# Patient Record
Sex: FEMALE | Race: BLACK OR AFRICAN AMERICAN | NOT HISPANIC OR LATINO | Employment: FULL TIME | URBAN - METROPOLITAN AREA
[De-identification: names, ages, dates, MRNs, and addresses within clinical notes are randomized per-mention and may not be internally consistent; named-entity substitution may affect disease eponyms.]

---

## 2018-04-09 ENCOUNTER — HOSPITAL ENCOUNTER (EMERGENCY)
Facility: OTHER | Age: 38
Discharge: HOME | End: 2018-04-09
Attending: EMERGENCY MEDICINE
Payer: COMMERCIAL

## 2018-04-09 VITALS
DIASTOLIC BLOOD PRESSURE: 86 MMHG | TEMPERATURE: 98 F | BODY MASS INDEX: 35.33 KG/M2 | WEIGHT: 192 LBS | RESPIRATION RATE: 18 BRPM | OXYGEN SATURATION: 100 % | HEIGHT: 62 IN | SYSTOLIC BLOOD PRESSURE: 136 MMHG | HEART RATE: 86 BPM

## 2018-04-09 DIAGNOSIS — R51.9 ACUTE NONINTRACTABLE HEADACHE, UNSPECIFIED HEADACHE TYPE: ICD-10-CM

## 2018-04-09 DIAGNOSIS — J06.9 VIRAL URI WITH COUGH: Primary | ICD-10-CM

## 2018-04-09 DIAGNOSIS — R05.9 COUGH: ICD-10-CM

## 2018-04-09 LAB
B-HCG UR QL: NEGATIVE
CTP QC/QA: YES

## 2018-04-09 PROCEDURE — 25000003 PHARM REV CODE 250: Performed by: PHYSICIAN ASSISTANT

## 2018-04-09 PROCEDURE — 81025 URINE PREGNANCY TEST: CPT | Performed by: PHYSICIAN ASSISTANT

## 2018-04-09 PROCEDURE — 99284 EMERGENCY DEPT VISIT MOD MDM: CPT

## 2018-04-09 RX ORDER — NAPROXEN 500 MG/1
500 TABLET ORAL
Status: COMPLETED | OUTPATIENT
Start: 2018-04-09 | End: 2018-04-09

## 2018-04-09 RX ORDER — BENZONATATE 100 MG/1
100 CAPSULE ORAL 3 TIMES DAILY PRN
Qty: 20 CAPSULE | Refills: 0 | Status: SHIPPED | OUTPATIENT
Start: 2018-04-09 | End: 2018-04-19

## 2018-04-09 RX ORDER — FLUTICASONE PROPIONATE 50 MCG
1 SPRAY, SUSPENSION (ML) NASAL DAILY
Qty: 1 BOTTLE | Refills: 0 | Status: SHIPPED | OUTPATIENT
Start: 2018-04-09 | End: 2019-11-27

## 2018-04-09 RX ORDER — IBUPROFEN 800 MG/1
800 TABLET ORAL EVERY 6 HOURS PRN
Qty: 20 TABLET | Refills: 0 | Status: SHIPPED | OUTPATIENT
Start: 2018-04-09 | End: 2019-11-27

## 2018-04-09 RX ADMIN — NAPROXEN 500 MG: 500 TABLET ORAL at 06:04
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
## 2018-06-21 ENCOUNTER — HOSPITAL ENCOUNTER (OUTPATIENT)
Dept: RADIOLOGY | Facility: OTHER | Age: 38
Discharge: HOME | End: 2018-06-21
Attending: INTERNAL MEDICINE
Payer: COMMERCIAL

## 2018-06-21 DIAGNOSIS — R60.0 EDEMA, LEG: ICD-10-CM

## 2018-06-21 PROCEDURE — 93971 EXTREMITY STUDY: CPT | Mod: 26,,, | Performed by: RADIOLOGY

## 2018-06-21 PROCEDURE — 93971 EXTREMITY STUDY: CPT | Mod: TC

## 2019-11-27 ENCOUNTER — OFFICE VISIT (OUTPATIENT)
Dept: INTERNAL MEDICINE | Facility: CLINIC | Age: 39
End: 2019-11-27
Payer: COMMERCIAL

## 2019-11-27 VITALS
RESPIRATION RATE: 16 BRPM | BODY MASS INDEX: 34.89 KG/M2 | SYSTOLIC BLOOD PRESSURE: 116 MMHG | HEIGHT: 62 IN | WEIGHT: 189.63 LBS | DIASTOLIC BLOOD PRESSURE: 80 MMHG | HEART RATE: 81 BPM | TEMPERATURE: 99 F

## 2019-11-27 DIAGNOSIS — M32.9 SYSTEMIC LUPUS ERYTHEMATOSUS, UNSPECIFIED SLE TYPE, UNSPECIFIED ORGAN INVOLVEMENT STATUS: ICD-10-CM

## 2019-11-27 DIAGNOSIS — Z12.39 BREAST CANCER SCREENING: ICD-10-CM

## 2019-11-27 DIAGNOSIS — Z12.4 CERVICAL CANCER SCREENING: ICD-10-CM

## 2019-11-27 DIAGNOSIS — M35.00 SJOGREN'S SYNDROME, WITH UNSPECIFIED ORGAN INVOLVEMENT: ICD-10-CM

## 2019-11-27 DIAGNOSIS — Z01.00 VISION TEST: ICD-10-CM

## 2019-11-27 DIAGNOSIS — Z00.00 ANNUAL PHYSICAL EXAM: Primary | ICD-10-CM

## 2019-11-27 PROCEDURE — 90471 TDAP VACCINE GREATER THAN OR EQUAL TO 7YO IM: ICD-10-PCS | Mod: S$GLB,,, | Performed by: HOSPITALIST

## 2019-11-27 PROCEDURE — 90715 TDAP VACCINE 7 YRS/> IM: CPT | Mod: S$GLB,,, | Performed by: HOSPITALIST

## 2019-11-27 PROCEDURE — 99386 PREV VISIT NEW AGE 40-64: CPT | Mod: 25,S$GLB,, | Performed by: HOSPITALIST

## 2019-11-27 PROCEDURE — 90715 TDAP VACCINE GREATER THAN OR EQUAL TO 7YO IM: ICD-10-PCS | Mod: S$GLB,,, | Performed by: HOSPITALIST

## 2019-11-27 PROCEDURE — 99999 PR PBB SHADOW E&M-EST. PATIENT-LVL V: CPT | Mod: PBBFAC,,, | Performed by: HOSPITALIST

## 2019-11-27 PROCEDURE — 99999 PR PBB SHADOW E&M-EST. PATIENT-LVL V: ICD-10-PCS | Mod: PBBFAC,,, | Performed by: HOSPITALIST

## 2019-11-27 PROCEDURE — 99386 PR PREVENTIVE VISIT,NEW,40-64: ICD-10-PCS | Mod: 25,S$GLB,, | Performed by: HOSPITALIST

## 2019-11-27 PROCEDURE — 90471 IMMUNIZATION ADMIN: CPT | Mod: S$GLB,,, | Performed by: HOSPITALIST

## 2019-11-27 RX ORDER — ERGOCALCIFEROL 1.25 MG/1
CAPSULE ORAL
Refills: 5 | COMMUNITY
Start: 2019-08-26 | End: 2019-12-03 | Stop reason: SDUPTHER

## 2019-11-27 RX ORDER — AMOXICILLIN 500 MG
CAPSULE ORAL
COMMUNITY
End: 2019-11-27

## 2019-11-27 RX ORDER — DICYCLOMINE HYDROCHLORIDE 20 MG/1
20 TABLET ORAL
COMMUNITY
Start: 2019-03-11 | End: 2019-11-27

## 2019-11-29 ENCOUNTER — HOSPITAL ENCOUNTER (OUTPATIENT)
Dept: RADIOLOGY | Facility: HOSPITAL | Age: 39
Discharge: HOME | End: 2019-11-29
Attending: HOSPITALIST
Payer: COMMERCIAL

## 2019-11-29 DIAGNOSIS — Z12.39 BREAST CANCER SCREENING: ICD-10-CM

## 2019-11-29 PROBLEM — M35.00 SJOGREN'S SYNDROME: Status: ACTIVE | Noted: 2019-11-29

## 2019-11-29 PROCEDURE — 77063 BREAST TOMOSYNTHESIS BI: CPT | Mod: 26,,, | Performed by: RADIOLOGY

## 2019-11-29 PROCEDURE — 77067 SCR MAMMO BI INCL CAD: CPT | Mod: 26,,, | Performed by: RADIOLOGY

## 2019-11-29 PROCEDURE — 77067 MAMMO DIGITAL SCREENING BILAT WITH TOMOSYNTHESIS_CAD: ICD-10-PCS | Mod: 26,,, | Performed by: RADIOLOGY

## 2019-11-29 PROCEDURE — 77067 SCR MAMMO BI INCL CAD: CPT | Mod: TC,PO

## 2019-11-29 PROCEDURE — 77063 MAMMO DIGITAL SCREENING BILAT WITH TOMOSYNTHESIS_CAD: ICD-10-PCS | Mod: 26,,, | Performed by: RADIOLOGY

## 2019-12-02 DIAGNOSIS — E55.9 VITAMIN D INSUFFICIENCY: ICD-10-CM

## 2019-12-03 RX ORDER — ERGOCALCIFEROL 1.25 MG/1
50000 CAPSULE ORAL
Qty: 12 CAPSULE | Refills: 3 | Status: SHIPPED | OUTPATIENT
Start: 2019-12-03 | End: 2020-11-30

## 2019-12-03 RX ORDER — ERGOCALCIFEROL 1.25 MG/1
CAPSULE ORAL
Refills: 5 | Status: CANCELLED | OUTPATIENT
Start: 2019-12-03

## 2019-12-03 NOTE — TELEPHONE ENCOUNTER
----- Message from Nina Gordon MD sent at 12/2/2019  8:13 AM CST -----  Please notify pt:   - Electrolytes, kidney and liver function are normal  - CBC is normal/acceptable range;    - TSH (thyroid) level is normal  - Vitamin D level is low; Vitamin D is important for healthy bones and calcium absorption. I recommend  To take supplement of vitamin D2 50,000 I.u x once a week x 2 months. Then start vitamin D3 1000 I.u daily. You can find this over the counter at your local pharmacy  - Cholesterol panel is normal  - HIV is negative  - Hemoglobin A1c is normal. No signs of diabetes   - Urine study is normal  - rheumatoid factor is negative.   - TRICIA lupus panel is still pending

## 2019-12-03 NOTE — TELEPHONE ENCOUNTER
Patient informed of results and verbalized understanding.  Did you want to send a Rx to the pharmacy for Vitamin D.

## 2019-12-03 NOTE — TELEPHONE ENCOUNTER
----- Message from Nian Gordon MD sent at 12/3/2019  2:10 PM CST -----  MMG is negative for malignancy

## 2019-12-03 NOTE — TELEPHONE ENCOUNTER
----- Message from Miladis Granger sent at 12/3/2019  4:19 PM CST -----  Contact: Pt self Mobile/Home [phone]   Patient is returning a phone call.  Who left a message for the patient: Mony Jo LPN    Does patient know what this is regarding:  A message from Mony Jo LPN

## 2019-12-03 NOTE — TELEPHONE ENCOUNTER
----- Message from Nina Gordon MD sent at 12/3/2019  2:07 PM CST -----  Please notify pt that TRICIA returned negative for lupus screen. Recommend to keep appointment with rheumatology coming up

## 2019-12-24 ENCOUNTER — OFFICE VISIT (OUTPATIENT)
Dept: OBSTETRICS AND GYNECOLOGY | Facility: CLINIC | Age: 39
End: 2019-12-24
Attending: OBSTETRICS & GYNECOLOGY
Payer: COMMERCIAL

## 2019-12-24 VITALS
SYSTOLIC BLOOD PRESSURE: 112 MMHG | BODY MASS INDEX: 34.69 KG/M2 | HEIGHT: 62 IN | WEIGHT: 188.5 LBS | DIASTOLIC BLOOD PRESSURE: 74 MMHG

## 2019-12-24 DIAGNOSIS — Z01.419 WELL FEMALE EXAM WITH ROUTINE GYNECOLOGICAL EXAM: Primary | ICD-10-CM

## 2019-12-24 DIAGNOSIS — Z11.3 SCREENING FOR VENEREAL DISEASE: ICD-10-CM

## 2019-12-24 DIAGNOSIS — N20.0 RENAL STONE: ICD-10-CM

## 2019-12-24 LAB
C TRACH DNA SPEC QL NAA+PROBE: NOT DETECTED
N GONORRHOEA DNA SPEC QL NAA+PROBE: NOT DETECTED

## 2019-12-24 PROCEDURE — 99386 PR PREVENTIVE VISIT,NEW,40-64: ICD-10-PCS | Mod: S$GLB,,, | Performed by: OBSTETRICS & GYNECOLOGY

## 2019-12-24 PROCEDURE — 99386 PREV VISIT NEW AGE 40-64: CPT | Mod: S$GLB,,, | Performed by: OBSTETRICS & GYNECOLOGY

## 2019-12-24 PROCEDURE — 88175 CYTOPATH C/V AUTO FLUID REDO: CPT

## 2019-12-24 PROCEDURE — 99999 PR PBB SHADOW E&M-EST. PATIENT-LVL III: CPT | Mod: PBBFAC,,, | Performed by: OBSTETRICS & GYNECOLOGY

## 2019-12-24 PROCEDURE — 99999 PR PBB SHADOW E&M-EST. PATIENT-LVL III: ICD-10-PCS | Mod: PBBFAC,,, | Performed by: OBSTETRICS & GYNECOLOGY

## 2019-12-24 PROCEDURE — 87491 CHLMYD TRACH DNA AMP PROBE: CPT

## 2019-12-24 RX ORDER — ONDANSETRON 4 MG/1
4 TABLET, ORALLY DISINTEGRATING ORAL
COMMUNITY
Start: 2019-12-18 | End: 2019-12-25

## 2019-12-24 RX ORDER — IBUPROFEN 600 MG/1
600 TABLET ORAL
COMMUNITY
Start: 2019-12-18 | End: 2019-12-25

## 2019-12-24 NOTE — PROGRESS NOTES
SUBJECTIVE:   39 y.o. female   for routine gyn exam. Patient's last menstrual period was 2019 (exact date)..  She has no unusual complaints.  Desires STD screen with GC, CT cx.  Went to ER for renal stone.  Has not yet had urology follow up.  Taking tylenol and ibuprofen only.  Did not like narcotic.         Past Medical History:   Diagnosis Date    Lupus     Rheumatoid arthritis     Sjoegren syndrome      Past Surgical History:   Procedure Laterality Date    CHOLECYSTECTOMY      DENTAL SURGERY      SINUS ENDOSCOPY      TUBAL LIGATION       Social History     Socioeconomic History    Marital status:      Spouse name: Not on file    Number of children: 3    Years of education: Not on file    Highest education level: Not on file   Occupational History    Occupation: Teacher   Social Needs    Financial resource strain: Not on file    Food insecurity:     Worry: Not on file     Inability: Not on file    Transportation needs:     Medical: Not on file     Non-medical: Not on file   Tobacco Use    Smoking status: Never Smoker    Smokeless tobacco: Never Used   Substance and Sexual Activity    Alcohol use: Yes     Comment: occ    Drug use: No    Sexual activity: Yes     Partners: Male     Birth control/protection: Condom, See Surgical Hx   Lifestyle    Physical activity:     Days per week: Not on file     Minutes per session: Not on file    Stress: Not on file   Relationships    Social connections:     Talks on phone: Not on file     Gets together: Not on file     Attends Jewish service: Not on file     Active member of club or organization: Not on file     Attends meetings of clubs or organizations: Not on file     Relationship status: Not on file   Other Topics Concern    Not on file   Social History Narrative    Not on file     Family History   Problem Relation Age of Onset    Diabetes Mother     Hypertension Mother     Lupus Father     Ovarian cancer Neg Hx     Colon  "cancer Neg Hx     Breast cancer Neg Hx      OB History    Para Term  AB Living   3 3 0 3   3   SAB TAB Ectopic Multiple Live Births                  # Outcome Date GA Lbr Johnson/2nd Weight Sex Delivery Anes PTL Lv   3             2             1                   Current Outpatient Medications   Medication Sig Dispense Refill    ergocalciferol (ERGOCALCIFEROL) 50,000 unit Cap Take 1 capsule (50,000 Units total) by mouth every 7 days. 12 capsule 3    ibuprofen (ADVIL,MOTRIN) 600 MG tablet Take 600 mg by mouth.      ondansetron (ZOFRAN-ODT) 4 MG TbDL Take 4 mg by mouth.       No current facility-administered medications for this visit.      Allergies: Penicillins and Latex     ROS:  Constitutional: no weight loss, weight gain, fever, fatigue  Eyes:  No vision changes, glasses/contacts  ENT/Mouth: No ulcers, sinus problems, ears ringing, headache  Cardiovascular: No inability to lie flat, chest pain, exercise intolerance, swelling, heart palpitations  Respiratory: No wheezing, coughing blood, shortness of breath, or cough  Gastrointestinal: No diarrhea, bloody stool, nausea/vomiting, constipation, gas, hemorrhoids  Genitourinary: No blood in urine, painful urination, urgency of urination, frequency of urination, incomplete emptying, incontinence, abnormal bleeding, painful periods, heavy periods, vaginal discharge, vaginal odor, painful intercourse, sexual problems, bleeding after intercourse.  Musculoskeletal: No muscle weakness  Skin/Breast: No painful breasts, nipple discharge, masses, rash, ulcers  Neurological: No passing out, seizures, numbness, headache  Endocrine: No diabetes, hypothyroid, hyperthyroid, hot flashes, hair loss, abnormal hair growth, acne  Psychiatric: No depression, crying  Hematologic: No bruises, bleeding, swollen lymph nodes, anemia.      OBJECTIVE:   The patient appears well, alert, oriented x 3, in no distress.  /74   Ht 5' 2" (1.575 m)   Wt 85.5 " kg (188 lb 7.9 oz)   LMP 12/09/2019 (Exact Date)   BMI 34.48 kg/m²   NECK: no thyromegaly, trachea midline  SKIN: no acne, striae, hirsutism  CHEST: CTAB  CV: RRR  BREAST EXAM: breasts appear normal, no suspicious masses, no skin or nipple changes or axillary nodes  ABDOMEN: no hernias, masses, or hepatosplenomegaly  GENITALIA: normal external genitalia, no erythema, no discharge  URETHRA: normal urethra, normal urethral meatus  VAGINA: Normal  CERVIX: no lesions or cervical motion tenderness  UTERUS: 10-12 week size, normal position, consistency, mobility, non-tender  ADNEXA: no mass, fullness, tenderness      ASSESSMENT:   1. Well female exam with routine gynecological exam  Liquid-Based Pap Smear, Screening   2. Screening for venereal disease  C. trachomatis/N. gonorrhoeae by [facility]; Cervix   3. Renal stone  Ambulatory consult to Urology       PLAN:   Orders Placed This Encounter    C. trachomatis/N. gonorrhoeae by [facility]; Cervix    Ambulatory consult to Urology    Liquid-Based Pap Smear, Screening     Discussed healthy lifestyle including regular exercise, healthy eating, etc.  Return to clinic in 1 year

## 2019-12-24 NOTE — LETTER
December 24, 2019      Nina Gordon MD  [address] [place] [address] [facility] [address]           [facility] [id number] [address]  [address] [address]  Phone: [phone]  Fax: [phone]          Patient: Kimmy De La Paz   MR Number: [id number]   YOB: 1980   Date of Visit: 12/24/2019       Dear Dr. Nina Gordon:    Thank you for referring Kimmy De La Paz to me for evaluation. Attached you will find relevant portions of my assessment and plan of care.    If you have questions, please do not hesitate to call me. I look forward to following Kimmy De La Paz along with you.    Sincerely,    Mary Thao MD    Enclosure  CC:  No Recipients    If you would like to receive this communication electronically, please contact [email or web address] or [phone] to request more information on [facility] access.    For providers and/or their staff who would like to refer a patient to [facility], please contact us through our one-stop-shop provider referral line, [facility], at [phone].    If you feel you have received this communication in error or would no longer like to receive these types of communications, please e-mail [email or web address]

## 2019-12-31 ENCOUNTER — OFFICE VISIT (OUTPATIENT)
Dept: UROLOGY | Facility: CLINIC | Age: 39
End: 2019-12-31
Attending: OBSTETRICS & GYNECOLOGY
Payer: COMMERCIAL

## 2019-12-31 VITALS — HEART RATE: 81 BPM | SYSTOLIC BLOOD PRESSURE: 139 MMHG | DIASTOLIC BLOOD PRESSURE: 86 MMHG

## 2019-12-31 DIAGNOSIS — N20.0 NEPHROLITHIASIS: Primary | ICD-10-CM

## 2019-12-31 DIAGNOSIS — N20.1 URETEROLITHIASIS: ICD-10-CM

## 2019-12-31 PROCEDURE — 87086 URINE CULTURE/COLONY COUNT: CPT

## 2019-12-31 PROCEDURE — 99203 OFFICE O/P NEW LOW 30 MIN: CPT | Mod: S$GLB,,, | Performed by: NURSE PRACTITIONER

## 2019-12-31 PROCEDURE — 99203 PR OFFICE/OUTPT VISIT, NEW, LEVL III, 30-44 MIN: ICD-10-PCS | Mod: S$GLB,,, | Performed by: NURSE PRACTITIONER

## 2019-12-31 RX ORDER — TAMSULOSIN HYDROCHLORIDE 0.4 MG/1
0.4 CAPSULE ORAL DAILY
Qty: 30 CAPSULE | Refills: 0 | Status: SHIPPED | OUTPATIENT
Start: 2019-12-31 | End: 2020-12-04

## 2019-12-31 NOTE — LETTER
December 31, 2019      Mary Thao MD  [address] [facility] [address]  [facility] [address]           [facility] [address] [address]  [address] [address]  Phone: [phone]  Fax: [phone]          Patient: Kimmy De La Paz   MR Number: [id number]   YOB: 1980   Date of Visit: 12/31/2019       Dear Dr. Mary Thao:    Thank you for referring Kimmy De La Paz to me for evaluation. Attached you will find relevant portions of my assessment and plan of care.    If you have questions, please do not hesitate to call me. I look forward to following Kimmy De La Paz along with you.    Sincerely,    Antoinette Suero, NP    Enclosure  CC:  No Recipients    If you would like to receive this communication electronically, please contact [email or web address] or [phone] to request more information on [facility] Link access.    For providers and/or their staff who would like to refer a patient to [facility], please contact us through our one-stop-shop provider referral line, [facility], at [phone].    If you feel you have received this communication in error or would no longer like to receive these types of communications, please e-mail [email or web address]

## 2019-12-31 NOTE — PROGRESS NOTES
Subjective:      Kimmy De La Paz is a 39 y.o. female who was self-referred for evaluation of her nephrolithiasis.      The patient presented to the ED on [facility] on 12/18 with left flank pain. CT scan revealed a 2m0h4cf stone at her left UVJ with moderate hydronephrosis (report reviewed through care everywhere). Creatinine was stable and UA was negative for infection. She was given zofran, percocet and toradol.    Today she reports continued intermittent moderate left flank pain. Also with urinary frequency and urgency. Denies dysuria, fever/chills and N/V.     + history of stone that she passed without intervention in 2018. Unsure the composition of the stone.     The following portions of the patient's history were reviewed and updated as appropriate: allergies, current medications, past family history, past medical history, past social history, past surgical history and problem list.    Review of Systems  Constitutional: no fever or chills  ENT: no nasal congestion or sore throat  Respiratory: no cough or shortness of breath  Cardiovascular: no chest pain or palpitations  Gastrointestinal: no nausea or vomiting, tolerating diet  Genitourinary: as per HPI  Hematologic/Lymphatic: no easy bruising or lymphadenopathy  Musculoskeletal: no arthralgias or myalgias  Neurological: no seizures or tremors  Behavioral/Psych: no auditory or visual hallucinations     Objective:   Vital Signs:   Vitals:    12/31/19 0749   BP: 139/86   Pulse: 81       Physical Exam   General: alert and oriented, no acute distress  Head: normocephalic, atraumatic  Neck: supple, no lymphadenopathy, normal ROM, no masses  Respiratory: Symmetric expansion, non-labored breathing  Cardiovascular: regular rate and rhythm, nomal pulses, no peripheral edema  Abdomen: soft, non tender, non distended, no palpable masses, no hernias, no hepatomegaly or splenomegaly  Pelvic: deferred  Lymphatic: no inguinal nodes  Skin: normal coloration and turgor, no  "rashes, no suspicious skin lesions noted  Neuro: alert and oriented x3, no gross deficits  Psych: normal judgment and insight, normal mood/affect and non-anxious  Mild left CVA tenderness    Lab Review   Urinalysis demonstrates negative for all components  Lab Results   Component Value Date    WBC 6.51 11/29/2019    HGB 11.6 (L) 11/29/2019    HCT 39.4 11/29/2019    MCV 82 11/29/2019     (H) 11/29/2019     Lab Results   Component Value Date    CREATININE 0.8 11/29/2019    BUN 10 11/29/2019       Imaging   CT reviewed in care everywhere- "Left kidney is edematous. There is perinephric and periureteral edema and mild hydroureteronephrosis due to a calculus which is within the distal ureter at the junction with the bladder. It has not entered the tunnel portion of the UVJ. It measures 3 mm x 5 mm x 5 mm. The right kidney is normal. Urinary bladder is mildly filled and not generally Thickened."     Assessment:   Ureterolithiasis    Plan:   Diagnoses and all orders for this visit:    Nephrolithiasis  -     tamsulosin (FLOMAX) 0.4 mg Cap; Take 1 capsule (0.4 mg total) by mouth once daily.  -     US Retroperitoneal Complete (Kidney and; Future  -     Urine culture    Ureterolithiasis    Plan:  --Advised of the options of watch and wait vs intervention  ureteroscopic approach vs ESL. Will proceed with trial of passage given the size and location of the stone   --Flomax  --Percocet, zofran and toradol PRN   --Strain all urine.  --Encourage fluids.  --Recommend general preventive measures, to include increased hydration, low Na diet, and increased citrus intake  --Discussed indications to present to ED, including fever, intractable pain, and intractable nausea/vomitting  --Follow up in 3 weeks with DONELL prior to visit     "

## 2020-01-01 LAB — BACTERIA UR CULT: NORMAL

## 2020-01-11 ENCOUNTER — HOSPITAL ENCOUNTER (OUTPATIENT)
Dept: RADIOLOGY | Facility: OTHER | Age: 40
Discharge: HOME | End: 2020-01-11
Attending: NURSE PRACTITIONER
Payer: COMMERCIAL

## 2020-01-11 DIAGNOSIS — N20.0 NEPHROLITHIASIS: ICD-10-CM

## 2020-01-11 PROCEDURE — 76770 US EXAM ABDO BACK WALL COMP: CPT | Mod: TC

## 2020-01-11 PROCEDURE — 76770 US RETROPERITONEAL COMPLETE: ICD-10-PCS | Mod: 26,,, | Performed by: RADIOLOGY

## 2020-01-11 PROCEDURE — 76770 US EXAM ABDO BACK WALL COMP: CPT | Mod: 26,,, | Performed by: RADIOLOGY

## 2020-01-13 LAB
FINAL PATHOLOGIC DIAGNOSIS: NORMAL
Lab: NORMAL

## 2020-01-20 ENCOUNTER — OFFICE VISIT (OUTPATIENT)
Dept: UROLOGY | Facility: CLINIC | Age: 40
End: 2020-01-20
Payer: COMMERCIAL

## 2020-01-20 VITALS
SYSTOLIC BLOOD PRESSURE: 135 MMHG | HEART RATE: 81 BPM | HEIGHT: 62 IN | WEIGHT: 196 LBS | BODY MASS INDEX: 36.07 KG/M2 | DIASTOLIC BLOOD PRESSURE: 89 MMHG

## 2020-01-20 DIAGNOSIS — N20.0 NEPHROLITHIASIS: Primary | ICD-10-CM

## 2020-01-20 PROCEDURE — 99213 PR OFFICE/OUTPT VISIT, EST, LEVL III, 20-29 MIN: ICD-10-PCS | Mod: S$GLB,,, | Performed by: NURSE PRACTITIONER

## 2020-01-20 PROCEDURE — 99213 OFFICE O/P EST LOW 20 MIN: CPT | Mod: S$GLB,,, | Performed by: NURSE PRACTITIONER

## 2020-01-20 PROCEDURE — 3008F PR BODY MASS INDEX (BMI) DOCUMENTED: ICD-10-PCS | Mod: CPTII,S$GLB,, | Performed by: NURSE PRACTITIONER

## 2020-01-20 PROCEDURE — 3008F BODY MASS INDEX DOCD: CPT | Mod: CPTII,S$GLB,, | Performed by: NURSE PRACTITIONER

## 2020-01-20 NOTE — PROGRESS NOTES
"Subjective:      Kimmy De La Paz is a 39 y.o. female who returns today regarding her ureterolithiasis.     + history of stone that she passed without intervention in 2018. Unsure the composition of the stone.     The patient presented to the ED on [facility] on 12/18 with left flank pain. CT scan revealed a 3y3w9ii stone at her left UVJ with moderate hydronephrosis (report reviewed through care everywhere). Creatinine was stable and UA was negative for infection. She was given zofran, percocet and toradol.    She presents today with follow up DONELL. Denies flank pain for the last few weeks. Denies urinary symptoms. Denies fever/chills and N/V. Denies seeing the stone pass, although did not strain her urine consistently.     The following portions of the patient's history were reviewed and updated as appropriate: allergies, current medications, past family history, past medical history, past social history, past surgical history and problem list.    Review of Systems  Constitutional: no fever or chills  ENT: no nasal congestion or sore throat  Respiratory: no cough or shortness of breath  Cardiovascular: no chest pain or palpitations  Gastrointestinal: no nausea or vomiting, tolerating diet  Genitourinary: as per HPI  Hematologic/Lymphatic: no easy bruising or lymphadenopathy  Musculoskeletal: no arthralgias or myalgias  Neurological: no seizures or tremors  Behavioral/Psych: no auditory or visual hallucinations     Objective:   Vital Signs:/89 (BP Location: Right arm, Patient Position: Sitting, BP Method: X-Large (Automatic))   Pulse 81   Ht 5' 2" (1.575 m)   Wt 88.9 kg (196 lb)   LMP 01/04/2020   BMI 35.85 kg/m²     Physical Exam   General: alert and oriented, no acute distress  Head: normocephalic, atraumatic  Neck: supple, no lymphadenopathy, normal ROM, no masses  Respiratory: Symmetric expansion, non-labored breathing  Cardiovascular: regular rate and rhythm, nomal pulses, no peripheral edema  Abdomen: " soft, non tender, non distended, no palpable masses, no hernias, no hepatomegaly or splenomegaly  Pelvic: deferred  Lymphatic: no inguinal nodes  Skin: normal coloration and turgor, no rashes, no suspicious skin lesions noted  Neuro: alert and oriented x3, no gross deficits  Psych: normal judgment and insight, normal mood/affect and non-anxious  No CVA tenderness    Lab Review   Urinalysis demonstrates : no specimen   Lab Results   Component Value Date    WBC 6.51 11/29/2019    HGB 11.6 (L) 11/29/2019    HCT 39.4 11/29/2019    MCV 82 11/29/2019     (H) 11/29/2019     Lab Results   Component Value Date    CREATININE 0.8 11/29/2019    BUN 10 11/29/2019       Imaging   DONELL (1/11/20)- no stones, masses or hydro    Assessment:     1. Nephrolithiasis        Plan:   Kimmy was seen today for follow-up.    Diagnoses and all orders for this visit:    Nephrolithiasis  -     POCT URINE DIPSTICK WITHOUT MICROSCOPE  -     X-Ray KUB; Future    Plan:  --Explained that the stone has likely passed given the lack of pain and hydro on US  --Recommend general preventive measures, to include increased hydration, low Na diet, and increased citrus intake  --Follow up in 6 months with KUB to monitor

## 2020-05-18 ENCOUNTER — OFFICE VISIT (OUTPATIENT)
Dept: INTERNAL MEDICINE | Facility: CLINIC | Age: 40
End: 2020-05-18
Payer: COMMERCIAL

## 2020-05-18 ENCOUNTER — LAB VISIT (OUTPATIENT)
Dept: LAB | Facility: HOSPITAL | Age: 40
End: 2020-05-18
Attending: HOSPITALIST
Payer: COMMERCIAL

## 2020-05-18 VITALS
HEIGHT: 62 IN | HEART RATE: 108 BPM | RESPIRATION RATE: 18 BRPM | SYSTOLIC BLOOD PRESSURE: 110 MMHG | WEIGHT: 182.75 LBS | DIASTOLIC BLOOD PRESSURE: 82 MMHG | OXYGEN SATURATION: 98 % | BODY MASS INDEX: 33.63 KG/M2 | TEMPERATURE: 99 F

## 2020-05-18 DIAGNOSIS — R07.9 CHEST PAIN, UNSPECIFIED TYPE: ICD-10-CM

## 2020-05-18 DIAGNOSIS — Z01.84 ANTIBODY RESPONSE EXAMINATION: ICD-10-CM

## 2020-05-18 DIAGNOSIS — F41.9 ANXIETY AND DEPRESSION: Primary | ICD-10-CM

## 2020-05-18 DIAGNOSIS — H53.8 BLURRY VISION: ICD-10-CM

## 2020-05-18 DIAGNOSIS — F32.A ANXIETY AND DEPRESSION: Primary | ICD-10-CM

## 2020-05-18 DIAGNOSIS — Z20.822 EXPOSURE TO COVID-19 VIRUS: ICD-10-CM

## 2020-05-18 LAB — SARS-COV-2 IGG SERPLBLD QL IA.RAPID: NEGATIVE

## 2020-05-18 PROCEDURE — 93010 EKG 12-LEAD: ICD-10-PCS | Mod: S$GLB,,, | Performed by: INTERNAL MEDICINE

## 2020-05-18 PROCEDURE — 93005 EKG 12-LEAD: ICD-10-PCS | Mod: S$GLB,,, | Performed by: HOSPITALIST

## 2020-05-18 PROCEDURE — 99214 OFFICE O/P EST MOD 30 MIN: CPT | Mod: S$GLB,,, | Performed by: HOSPITALIST

## 2020-05-18 PROCEDURE — 93010 ELECTROCARDIOGRAM REPORT: CPT | Mod: S$GLB,,, | Performed by: INTERNAL MEDICINE

## 2020-05-18 PROCEDURE — 99999 PR PBB SHADOW E&M-EST. PATIENT-LVL IV: ICD-10-PCS | Mod: PBBFAC,,, | Performed by: HOSPITALIST

## 2020-05-18 PROCEDURE — 99999 PR PBB SHADOW E&M-EST. PATIENT-LVL IV: CPT | Mod: PBBFAC,,, | Performed by: HOSPITALIST

## 2020-05-18 PROCEDURE — 3008F BODY MASS INDEX DOCD: CPT | Mod: CPTII,S$GLB,, | Performed by: HOSPITALIST

## 2020-05-18 PROCEDURE — 93005 ELECTROCARDIOGRAM TRACING: CPT | Mod: S$GLB,,, | Performed by: HOSPITALIST

## 2020-05-18 PROCEDURE — 99214 PR OFFICE/OUTPT VISIT, EST, LEVL IV, 30-39 MIN: ICD-10-PCS | Mod: S$GLB,,, | Performed by: HOSPITALIST

## 2020-05-18 PROCEDURE — 86769 SARS-COV-2 COVID-19 ANTIBODY: CPT

## 2020-05-18 PROCEDURE — 3008F PR BODY MASS INDEX (BMI) DOCUMENTED: ICD-10-PCS | Mod: CPTII,S$GLB,, | Performed by: HOSPITALIST

## 2020-05-18 PROCEDURE — 36415 COLL VENOUS BLD VENIPUNCTURE: CPT | Mod: PO

## 2020-05-18 RX ORDER — CITALOPRAM 10 MG/1
10 TABLET ORAL DAILY
Qty: 30 TABLET | Refills: 5 | Status: SHIPPED | OUTPATIENT
Start: 2020-05-18 | End: 2022-11-09

## 2020-05-18 NOTE — PROGRESS NOTES
"Subjective:     @Patient ID: Kimmy De La Paz is a 39 y.o. female.    Chief Complaint: Follow-up    HPI  38 yo F presents with multiple concerns.    Reports lately feeling overwhelmed. Reports feeling depressed and anxious. 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 Reports sleeping issues. Reports staying up. Reports trouble concentrating. No SI. Was on medication in 2010 for anxiety/depression. Reports chest pain. States center/left chest. Thinks possibly related to anxiety/mood. She works as a . She would also like covid19 antibody testing.     Review of Systems   Constitutional: Positive for activity change. Negative for unexpected weight change.   HENT: Negative for hearing loss, rhinorrhea and trouble swallowing.    Eyes: Positive for discharge and visual disturbance.   Respiratory: Negative for chest tightness and wheezing.    Cardiovascular: Positive for chest pain. Negative for palpitations.   Gastrointestinal: Negative for blood in stool, constipation, diarrhea and vomiting.   Endocrine: Negative for polydipsia and polyuria.   Genitourinary: Positive for menstrual problem. Negative for difficulty urinating, dysuria and hematuria.   Musculoskeletal: Positive for arthralgias, joint swelling and neck pain.   Neurological: Positive for headaches. Negative for weakness.   Psychiatric/Behavioral: Positive for confusion and dysphoric mood.     Past medical history, surgical history, and family medical history reviewed and updated as appropriate.    Medications and allergies reviewed.     Objective:     Vitals:    05/18/20 1116   BP: 110/82   BP Location: Right arm   Patient Position: Sitting   BP Method: Large (Manual)   Pulse: 108   Resp: 18   Temp: 98.6 °F (37 °C)   TempSrc: Oral   SpO2: 98%   Weight: 82.9 kg (182 lb 12.2 oz)   Height: 5' 2" (1.575 m)     Body mass index is 33.43 kg/m².  Physical Exam   Constitutional: She is oriented to person, place, and time. She " appears well-developed and well-nourished.   HENT:   Head: Normocephalic and atraumatic.   Eyes: Conjunctivae are normal. Right eye exhibits no discharge. Left eye exhibits no discharge.   Neck: Normal range of motion. Neck supple.   Cardiovascular: Normal rate and regular rhythm. Exam reveals no friction rub.   No murmur heard.  Pulmonary/Chest: Effort normal and breath sounds normal.   Musculoskeletal: Normal range of motion. She exhibits no edema.   Neurological: She is alert and oriented to person, place, and time.   Skin: Skin is warm and dry.   Psychiatric: Her behavior is normal. Her mood appears anxious. She exhibits a depressed mood.   Vitals reviewed.      Lab Results   Component Value Date    WBC 6.51 11/29/2019    HGB 11.6 (L) 11/29/2019    HCT 39.4 11/29/2019     (H) 11/29/2019    CHOL 172 11/29/2019    TRIG 100 11/29/2019    HDL 44 11/29/2019    ALT 13 11/29/2019    AST 16 11/29/2019     11/29/2019    K 4.0 11/29/2019     11/29/2019    CREATININE 0.8 11/29/2019    BUN 10 11/29/2019    CO2 24 11/29/2019    TSH 1.243 11/29/2019    HGBA1C 5.4 11/29/2019       Assessment:     1. Anxiety and depression    2. Chest pain, unspecified type    3. Blurry vision    4. Antibody response examination      Plan:   Kimmy was seen today for follow-up.    Diagnoses and all orders for this visit:    Anxiety and depression  - Start SSRI celexa. Pt counseled to monitor for side effects. Will also refer to psychiatry for therapy and med management.   -     Ambulatory referral/consult to Psychiatry; Future    Chest pain, unspecified type  - Atypical for cardiac etiology. Will check EKG. Suspect due to anxiety/depression  -     IN OFFICE EKG 12-LEAD (to Muse)    Blurry vision  -     Ambulatory referral/consult to Optometry; Future    Antibody response examination  -     COVID-19 (SARS CoV-2) IgG Antibody; Future    Other orders  -     citalopram (CELEXA) 10 MG tablet; Take 1 tablet (10 mg total) by mouth  once daily.          RTC prn    Nina Gordon MD  Internal Medicine    5/18/2020

## 2020-05-22 ENCOUNTER — PATIENT MESSAGE (OUTPATIENT)
Dept: RHEUMATOLOGY | Facility: CLINIC | Age: 40
End: 2020-05-22

## 2020-06-03 ENCOUNTER — OFFICE VISIT (OUTPATIENT)
Dept: OPTOMETRY | Facility: CLINIC | Age: 40
End: 2020-06-03
Payer: COMMERCIAL

## 2020-06-03 DIAGNOSIS — H52.203 MYOPIA OF BOTH EYES WITH ASTIGMATISM AND PRESBYOPIA: ICD-10-CM

## 2020-06-03 DIAGNOSIS — H52.13 MYOPIA OF BOTH EYES WITH ASTIGMATISM AND PRESBYOPIA: ICD-10-CM

## 2020-06-03 DIAGNOSIS — H04.123 DRY EYE SYNDROME OF BOTH EYES: Primary | ICD-10-CM

## 2020-06-03 DIAGNOSIS — H52.4 MYOPIA OF BOTH EYES WITH ASTIGMATISM AND PRESBYOPIA: ICD-10-CM

## 2020-06-03 DIAGNOSIS — H53.8 BLURRY VISION: ICD-10-CM

## 2020-06-03 PROCEDURE — 99999 PR PBB SHADOW E&M-EST. PATIENT-LVL III: CPT | Mod: PBBFAC,,, | Performed by: OPTOMETRIST

## 2020-06-03 PROCEDURE — 92004 COMPRE OPH EXAM NEW PT 1/>: CPT | Mod: S$GLB,,, | Performed by: OPTOMETRIST

## 2020-06-03 PROCEDURE — 92004 PR EYE EXAM, NEW PATIENT,COMPREHESV: ICD-10-PCS | Mod: S$GLB,,, | Performed by: OPTOMETRIST

## 2020-06-03 PROCEDURE — 99999 PR PBB SHADOW E&M-EST. PATIENT-LVL III: ICD-10-PCS | Mod: PBBFAC,,, | Performed by: OPTOMETRIST

## 2020-06-03 PROCEDURE — 92015 DETERMINE REFRACTIVE STATE: CPT | Mod: S$GLB,,, | Performed by: OPTOMETRIST

## 2020-06-03 PROCEDURE — 92015 PR REFRACTION: ICD-10-PCS | Mod: S$GLB,,, | Performed by: OPTOMETRIST

## 2020-06-03 NOTE — PROGRESS NOTES
HPI     MARIAELENA 1 year ago somewhere else. Pt has Lupus, used to take plaquenil but   has not taken it in several years. Patient reports blur at distance and   near OD>OS, worse at night. Also reports a shadowing/haze effect with   vision. Patient does not wear correction for distance (used to wear them   10 years ago), wears OTC reading glasses prn. Reports HA and blurry vision   at near after reading for more than 30 minutes. Reports eye injury   (student bit upper eyelid OD) 2 year ago. Reports occasional itching and   crusting, worse in the morning. Reports occasional floaters that are   longstanding, denies flashes. No gtts. Reports hx of several concussions,   most recent one 2 years ago.    Last edited by Vero Martinez, OD on 6/3/2020  4:48 PM. (History)            Assessment /Plan     For exam results, see Encounter Report.    Dry eye syndrome of both eyes    Blurry vision  -     Ambulatory referral/consult to Optometry    Myopia of both eyes with astigmatism and presbyopia      1. Educated pt on findings. Recommended OTC ATs for added lubrication and comfort. RTC if symptoms persist.   2-3. Prescribe Spec Rx. Educated patient on changes. RTC in 1 yr for annual eye exam or prn.

## 2020-06-03 NOTE — LETTER
Malika 3, 2020      Nina Gordon MD  [address] [facility]  [place] [address]           [place] [facility]  [address] [facility]  [place] [address]  Phone: [phone]  Fax: [phone]          Patient: Kimmy De La Paz   MR Number: [id number]   YOB: 1980   Date of Visit: 6/3/2020       Dear Dr. Nina Gordon:    Thank you for referring Kimmy De La Paz to me for evaluation. Attached you will find relevant portions of my assessment and plan of care.    If you have questions, please do not hesitate to call me. I look forward to following Kimmy De La Paz along with you.    Sincerely,    Vero Martinez, OD    Enclosure  CC:  No Recipients    If you would like to receive this communication electronically, please contact [email or web address] or [phone] to request more information on [facility] access.    For providers and/or their staff who would like to refer a patient to [facility], please contact us through our one-stop-shop provider referral line, [facility], at [phone].    If you feel you have received this communication in error or would no longer like to receive these types of communications, please e-mail [email or web address]

## 2020-11-16 ENCOUNTER — TELEPHONE (OUTPATIENT)
Dept: INTERNAL MEDICINE | Facility: CLINIC | Age: 40
End: 2020-11-16

## 2020-11-16 ENCOUNTER — OFFICE VISIT (OUTPATIENT)
Dept: INTERNAL MEDICINE | Facility: CLINIC | Age: 40
End: 2020-11-16
Payer: COMMERCIAL

## 2020-11-16 DIAGNOSIS — R51.9 NONINTRACTABLE EPISODIC HEADACHE, UNSPECIFIED HEADACHE TYPE: ICD-10-CM

## 2020-11-16 DIAGNOSIS — M32.9 SYSTEMIC LUPUS ERYTHEMATOSUS, UNSPECIFIED SLE TYPE, UNSPECIFIED ORGAN INVOLVEMENT STATUS: ICD-10-CM

## 2020-11-16 DIAGNOSIS — R07.9 CHEST PAIN, UNSPECIFIED TYPE: ICD-10-CM

## 2020-11-16 DIAGNOSIS — H53.8 BLURRY VISION: ICD-10-CM

## 2020-11-16 DIAGNOSIS — R11.0 NAUSEA: ICD-10-CM

## 2020-11-16 DIAGNOSIS — M35.00 SJOGREN'S SYNDROME, WITH UNSPECIFIED ORGAN INVOLVEMENT: ICD-10-CM

## 2020-11-16 DIAGNOSIS — I15.9 SECONDARY HYPERTENSION: Primary | ICD-10-CM

## 2020-11-16 PROCEDURE — 99214 OFFICE O/P EST MOD 30 MIN: CPT | Mod: 95,,, | Performed by: INTERNAL MEDICINE

## 2020-11-16 PROCEDURE — 99214 PR OFFICE/OUTPT VISIT, EST, LEVL IV, 30-39 MIN: ICD-10-PCS | Mod: 95,,, | Performed by: INTERNAL MEDICINE

## 2020-11-16 NOTE — PROGRESS NOTES
Subjective:       Patient ID: Kimmy De La Paz is a 40 y.o. female.    Chief Complaint: No chief complaint on file.   Dictation #1  MRN:[id number]  CSN:[id number]  Dict   HPI  Review of Systems   Constitutional: Positive for activity change and unexpected weight change.   HENT: Negative for hearing loss, rhinorrhea and trouble swallowing.    Eyes: Positive for visual disturbance. Negative for discharge.   Respiratory: Positive for chest tightness. Negative for wheezing.    Cardiovascular: Positive for chest pain and palpitations.   Gastrointestinal: Positive for nausea. Negative for blood in stool, constipation, diarrhea and vomiting.   Endocrine: Negative for polydipsia and polyuria.   Genitourinary: Positive for menstrual problem. Negative for difficulty urinating, dysuria and hematuria.   Musculoskeletal: Positive for arthralgias. Negative for joint swelling and neck pain.   Neurological: Positive for weakness and headaches.   Psychiatric/Behavioral: Positive for confusion and dysphoric mood.       The patient location is: home  The chief complaint leading to consultation is: f/u ER visit    Visit type: audiovisual    Face to Face time with patient: 20 min  25 minutes of total time spent on the encounter, which includes face to face time and non-face to face time preparing to see the patient (eg, review of tests), Obtaining and/or reviewing separately obtained history, Documenting clinical information in the electronic or other health record, Independently interpreting results (not separately reported) and communicating results to the patient/family/caregiver, or Care coordination (not separately reported).         Each patient to whom he or she provides medical services by telemedicine is:  (1) informed of the relationship between the physician and patient and the respective role of any other health care provider with respect to management of the patient; and (2) notified that he or she may decline to receive  medical services by telemedicine and may withdraw from such care at any time.    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  She had a COVID test done that was negative but her blood pressure was found to be 180/121.  She has been under lot of stress apparently having to do with her job and that day felt nauseated, headache, chest pain.  She apparently has had headaches on and off at least since the time that she was in the emergency room about 1 week ago.  She has also had occasional chest pain since then.  Patient also continues to have intermittent nausea that may get worse with eating but she is not certain.  Her blood pressure as mentioned at the school was 180/121 and then at the emergency room was measured at 150/95.  She has not taken her blood pressure since then.  In in the emergency room she was treated with something for nausea and had a chest x-ray done but no EKG.    Past history she does have lupus and Sjogren's and is on medication for that.  She has a family history of her father having hypertension but no one else in the family including 3 brothers and a sister.    Physical exam:  Not done since this is a virtual visit    Impression:  1.  Hypertensive reading for which she was sent the emergency room 1 week ago but no follow-up or medication sent home with her.  2.  Systemic lupus erythematosus  3.  Sjogren syndrome  4.  Recurrent headaches that is sound somewhat like migraine with some visual blurring an what sounds like specks or floaters in her visual field while she is having the headache  5.  Recurrent nausea  6.  Complains of a lot of stress related to her job in uncertain knees of her work situation  7.  Chest pain in the emergency room a no EKG was done    Plan:  1.  Patient was told she needs a actual physical evaluation to assess her condition not something I can do virtually.  I do not have a follow-up blood pressure on  her which is critical and she has the uncertainty about the cause of her headaches, chest pain, cause in the nausea.  She also apparently is having a lot of anxiety underlying these problems.  In addition she has some forms to return to work at school that I have told her to bring a long when she is seen.    Objective:        Assessment:       1. Secondary hypertension    2. Chest pain, unspecified type    3. Blurry vision    4. Sjogren's syndrome, with unspecified organ involvement    5. Systemic lupus erythematosus, unspecified SLE type, unspecified organ involvement status    6. Nausea    7. Nonintractable episodic headache, unspecified headache type        Plan:

## 2020-11-16 NOTE — TELEPHONE ENCOUNTER
----- Message from Shwetha Singh sent at 11/16/2020  4:05 PM CST -----  Regarding: Patient advice  Contact: Pt  Pt called in regards to coming in to the office tomorrow for testing       Please advise       Pt can be reached at [phone]

## 2020-11-16 NOTE — TELEPHONE ENCOUNTER
See dr alvarenga's note from today.    He wanted her to see someone in clinic to clear to return to work.  I booked her to see her pcp dr becerra on Wednesday.    Patient aware of date/time and released note on portal about appt being made to clear for work.

## 2020-11-18 ENCOUNTER — OFFICE VISIT (OUTPATIENT)
Dept: INTERNAL MEDICINE | Facility: CLINIC | Age: 40
End: 2020-11-18
Payer: COMMERCIAL

## 2020-11-18 VITALS
HEART RATE: 76 BPM | OXYGEN SATURATION: 100 % | TEMPERATURE: 98 F | WEIGHT: 195.13 LBS | SYSTOLIC BLOOD PRESSURE: 148 MMHG | BODY MASS INDEX: 35.91 KG/M2 | DIASTOLIC BLOOD PRESSURE: 96 MMHG | HEIGHT: 62 IN

## 2020-11-18 DIAGNOSIS — F41.9 ANXIETY AND DEPRESSION: ICD-10-CM

## 2020-11-18 DIAGNOSIS — R03.0 ELEVATED BP WITHOUT DIAGNOSIS OF HYPERTENSION: Primary | ICD-10-CM

## 2020-11-18 DIAGNOSIS — G44.209 TENSION-TYPE HEADACHE, NOT INTRACTABLE, UNSPECIFIED CHRONICITY PATTERN: ICD-10-CM

## 2020-11-18 DIAGNOSIS — R07.9 CHEST PAIN, UNSPECIFIED TYPE: ICD-10-CM

## 2020-11-18 DIAGNOSIS — R00.2 PALPITATIONS: ICD-10-CM

## 2020-11-18 DIAGNOSIS — F32.A ANXIETY AND DEPRESSION: ICD-10-CM

## 2020-11-18 PROCEDURE — 1125F AMNT PAIN NOTED PAIN PRSNT: CPT | Mod: S$GLB,,, | Performed by: HOSPITALIST

## 2020-11-18 PROCEDURE — 3008F PR BODY MASS INDEX (BMI) DOCUMENTED: ICD-10-PCS | Mod: CPTII,S$GLB,, | Performed by: HOSPITALIST

## 2020-11-18 PROCEDURE — 99999 PR PBB SHADOW E&M-EST. PATIENT-LVL IV: CPT | Mod: PBBFAC,,, | Performed by: HOSPITALIST

## 2020-11-18 PROCEDURE — 99214 OFFICE O/P EST MOD 30 MIN: CPT | Mod: S$GLB,,, | Performed by: HOSPITALIST

## 2020-11-18 PROCEDURE — 3008F BODY MASS INDEX DOCD: CPT | Mod: CPTII,S$GLB,, | Performed by: HOSPITALIST

## 2020-11-18 PROCEDURE — 99999 PR PBB SHADOW E&M-EST. PATIENT-LVL IV: ICD-10-PCS | Mod: PBBFAC,,, | Performed by: HOSPITALIST

## 2020-11-18 PROCEDURE — 1125F PR PAIN SEVERITY QUANTIFIED, PAIN PRESENT: ICD-10-PCS | Mod: S$GLB,,, | Performed by: HOSPITALIST

## 2020-11-18 PROCEDURE — 99214 PR OFFICE/OUTPT VISIT, EST, LEVL IV, 30-39 MIN: ICD-10-PCS | Mod: S$GLB,,, | Performed by: HOSPITALIST

## 2020-11-18 RX ORDER — SUMATRIPTAN 50 MG/1
TABLET, FILM COATED ORAL
Qty: 9 TABLET | Refills: 5 | Status: CANCELLED | OUTPATIENT
Start: 2020-11-18

## 2020-11-18 RX ORDER — METHOCARBAMOL 500 MG/1
500 TABLET, FILM COATED ORAL NIGHTLY PRN
Qty: 30 TABLET | Refills: 3 | Status: SHIPPED | OUTPATIENT
Start: 2020-11-18 | End: 2020-11-28

## 2020-11-18 NOTE — PROGRESS NOTES
"Subjective:     @Patient ID: Kimmy De La Paz is a 40 y.o. female.    Chief Complaint: Hypertension, Nausea (loss of appeitte), and Headache    HPI    41 yo F with sjogren's. Unclear if pt has SLE (pt reports she has hx but per chart review, her rheumatologist in [place] treated her for Sjogrens and her TRICIA in 5/2020 was negative). She presents for evaluation of bp, nausea and HA. Reports bp check screening showed /100s. Reports at the time feeling HA, nauseated, blurred vision. Reports heart palpitations. Reports pressure band on the head, reports also neck posture. Reports under a lot of stress out. Does not take anything for the headache. She is sensitive to caffeine. Also has had flexeril.     Seen at [facility] bp elevated 11/10/20. Recommend to f/u outpatient. She was given naproxen and zofran       Review of Systems   Constitutional: Negative for chills and fever.   HENT: Negative for congestion and sore throat.    Eyes: Negative for pain and visual disturbance.   Respiratory: Negative for cough and shortness of breath.    Cardiovascular: Negative for chest pain and leg swelling.   Gastrointestinal: Negative for abdominal pain, nausea and vomiting.   Endocrine: Negative for polydipsia and polyuria.   Genitourinary: Negative for difficulty urinating and dysuria.   Musculoskeletal: Negative for arthralgias and back pain.   Skin: Negative for rash and wound.   Neurological: Negative for dizziness, weakness and headaches.   Psychiatric/Behavioral: Negative for agitation and confusion.     Past medical history, surgical history, and family medical history reviewed and updated as appropriate.    Medications and allergies reviewed.     Objective:     Vitals:    11/18/20 1250   BP: 138/82   BP Location: Left arm   Patient Position: Sitting   BP Method: Large (Manual)   Pulse: 76   Temp: 98 °F (36.7 °C)   TempSrc: Temporal   SpO2: 100%   Weight: 88.5 kg (195 lb 1.7 oz)   Height: 5' 2" (1.575 m) "     Body mass index is 35.69 kg/m².  Physical Exam  Vitals signs reviewed.   Constitutional:       General: She is not in acute distress.     Appearance: She is well-developed.   HENT:      Head: Normocephalic and atraumatic.      Right Ear: Tympanic membrane normal.      Left Ear: Tympanic membrane normal.      Mouth/Throat:      Mouth: Mucous membranes are moist.      Pharynx: No oropharyngeal exudate.   Eyes:      General:         Right eye: No discharge.         Left eye: No discharge.      Conjunctiva/sclera: Conjunctivae normal.   Neck:      Musculoskeletal: Normal range of motion and neck supple.   Cardiovascular:      Rate and Rhythm: Normal rate and regular rhythm.      Heart sounds: No murmur. No friction rub.   Pulmonary:      Effort: Pulmonary effort is normal.      Breath sounds: Normal breath sounds.   Abdominal:      General: Bowel sounds are normal. There is no distension.      Palpations: Abdomen is soft.      Tenderness: There is no abdominal tenderness. There is no guarding.   Musculoskeletal: Normal range of motion.      Right lower leg: No edema.      Left lower leg: No edema.   Lymphadenopathy:      Cervical: No cervical adenopathy.   Skin:     General: Skin is warm and dry.   Neurological:      Mental Status: She is alert and oriented to person, place, and time.   Psychiatric:         Mood and Affect: Mood normal.         Behavior: Behavior normal.         Lab Results   Component Value Date    WBC 6.51 11/29/2019    HGB 11.6 (L) 11/29/2019    HCT 39.4 11/29/2019     (H) 11/29/2019    CHOL 172 11/29/2019    TRIG 100 11/29/2019    HDL 44 11/29/2019    ALT 13 11/29/2019    AST 16 11/29/2019     11/29/2019    K 4.0 11/29/2019     11/29/2019    CREATININE 0.8 11/29/2019    BUN 10 11/29/2019    CO2 24 11/29/2019    TSH 1.243 11/29/2019    HGBA1C 5.4 11/29/2019       Assessment:     1. Elevated BP without diagnosis of hypertension    2. Chest pain, unspecified type    3. Anxiety  and depression    4. Palpitations    5. Tension-type headache, not intractable, unspecified chronicity pattern      Plan:   Kimmy was seen today for hypertension, nausea and headache.    Diagnoses and all orders for this visit:    Elevated BP without diagnosis of hypertension         - BP better today but did increase again. Possibly high due to tension HAs and neck pain. Recommend pt keep bp log and notify MD if no improvement. RTC in 2 weeks for bp check.     Chest pain, unspecified type  -     Exercise Stress - EKG; Future    Anxiety and depression        - Pt has not started celexa. Encouraged pt to start medication     Palpitations  - Suspect anxiety related   -     Holter monitor - 24 hour; Future    Other orders   -     methocarbamoL (ROBAXIN) 500 MG Tab; Take 1 tablet (500 mg total) by mouth nightly as needed.      RTC prn     Nina Gordon MD  Internal Medicine    11/18/2020

## 2020-11-23 ENCOUNTER — HOSPITAL ENCOUNTER (OUTPATIENT)
Dept: CARDIOLOGY | Facility: HOSPITAL | Age: 40
Discharge: HOME | End: 2020-11-23
Attending: HOSPITALIST
Payer: COMMERCIAL

## 2020-11-23 VITALS — BODY MASS INDEX: 35.51 KG/M2 | WEIGHT: 193 LBS | HEIGHT: 62 IN

## 2020-11-23 DIAGNOSIS — R07.9 CHEST PAIN, UNSPECIFIED TYPE: ICD-10-CM

## 2020-11-23 DIAGNOSIS — R07.9 CHEST PAIN, UNSPECIFIED TYPE: Primary | ICD-10-CM

## 2020-11-23 LAB
CV STRESS BASE HR: 77 BPM
DIASTOLIC BLOOD PRESSURE: 70 MMHG
OHS CV CPX 1 MINUTE RECOVERY HEART RATE: 162 BPM
OHS CV CPX 85 PERCENT MAX PREDICTED HEART RATE MALE: 144
OHS CV CPX ESTIMATED METS: 12
OHS CV CPX MAX PREDICTED HEART RATE: 170
OHS CV CPX PATIENT IS FEMALE: 1
OHS CV CPX PATIENT IS MALE: 0
OHS CV CPX PEAK DIASTOLIC BLOOD PRESSURE: 79 MMHG
OHS CV CPX PEAK HEAR RATE: 184 BPM
OHS CV CPX PEAK RATE PRESSURE PRODUCT: NORMAL
OHS CV CPX PEAK SYSTOLIC BLOOD PRESSURE: 208 MMHG
OHS CV CPX PERCENT MAX PREDICTED HEART RATE ACHIEVED: 108
OHS CV CPX RATE PRESSURE PRODUCT PRESENTING: 7854
STRESS ECHO POST EXERCISE DUR MIN: 7 MINUTES
STRESS ECHO POST EXERCISE DUR SEC: 22 SECONDS
STRESS ST DEPRESSION: 0.3 MM
SYSTOLIC BLOOD PRESSURE: 102 MMHG

## 2020-11-23 PROCEDURE — 93018 CV STRESS TEST I&R ONLY: CPT | Mod: ,,, | Performed by: INTERNAL MEDICINE

## 2020-11-23 PROCEDURE — 93016 EXERCISE STRESS - EKG (CUPID ONLY): ICD-10-PCS | Mod: ,,, | Performed by: INTERNAL MEDICINE

## 2020-11-23 PROCEDURE — 93016 CV STRESS TEST SUPVJ ONLY: CPT | Mod: ,,, | Performed by: INTERNAL MEDICINE

## 2020-11-23 PROCEDURE — 93017 CV STRESS TEST TRACING ONLY: CPT

## 2020-11-23 PROCEDURE — 93018 EXERCISE STRESS - EKG (CUPID ONLY): ICD-10-PCS | Mod: ,,, | Performed by: INTERNAL MEDICINE

## 2020-11-24 ENCOUNTER — TELEPHONE (OUTPATIENT)
Dept: INTERNAL MEDICINE | Facility: CLINIC | Age: 40
End: 2020-11-24
Payer: COMMERCIAL

## 2020-11-30 ENCOUNTER — LAB VISIT (OUTPATIENT)
Dept: LAB | Facility: HOSPITAL | Age: 40
End: 2020-11-30
Payer: COMMERCIAL

## 2020-11-30 ENCOUNTER — PATIENT OUTREACH (OUTPATIENT)
Dept: ADMINISTRATIVE | Facility: OTHER | Age: 40
End: 2020-11-30

## 2020-11-30 ENCOUNTER — PATIENT MESSAGE (OUTPATIENT)
Dept: RHEUMATOLOGY | Facility: CLINIC | Age: 40
End: 2020-11-30

## 2020-11-30 ENCOUNTER — OFFICE VISIT (OUTPATIENT)
Dept: RHEUMATOLOGY | Facility: CLINIC | Age: 40
End: 2020-11-30
Payer: COMMERCIAL

## 2020-11-30 VITALS
WEIGHT: 203.25 LBS | DIASTOLIC BLOOD PRESSURE: 97 MMHG | SYSTOLIC BLOOD PRESSURE: 154 MMHG | HEART RATE: 92 BPM | BODY MASS INDEX: 37.4 KG/M2 | HEIGHT: 62 IN

## 2020-11-30 DIAGNOSIS — M35.00 SJOGREN'S SYNDROME, WITH UNSPECIFIED ORGAN INVOLVEMENT: ICD-10-CM

## 2020-11-30 DIAGNOSIS — M35.00 SJOGREN'S SYNDROME, WITH UNSPECIFIED ORGAN INVOLVEMENT: Primary | ICD-10-CM

## 2020-11-30 DIAGNOSIS — F32.A DEPRESSION, UNSPECIFIED DEPRESSION TYPE: ICD-10-CM

## 2020-11-30 LAB
ALBUMIN SERPL BCP-MCNC: 3.7 G/DL (ref 3.5–5.2)
ALP SERPL-CCNC: 73 U/L (ref 55–135)
ALT SERPL W/O P-5'-P-CCNC: 11 U/L (ref 10–44)
ANION GAP SERPL CALC-SCNC: 9 MMOL/L (ref 8–16)
AST SERPL-CCNC: 16 U/L (ref 10–40)
BASOPHILS # BLD AUTO: 0.04 K/UL (ref 0–0.2)
BASOPHILS NFR BLD: 0.6 % (ref 0–1.9)
BILIRUB SERPL-MCNC: 0.4 MG/DL (ref 0.1–1)
BUN SERPL-MCNC: 9 MG/DL (ref 6–20)
C3 SERPL-MCNC: 133 MG/DL (ref 50–180)
C4 SERPL-MCNC: 47 MG/DL (ref 11–44)
CALCIUM SERPL-MCNC: 8.3 MG/DL (ref 8.7–10.5)
CHLORIDE SERPL-SCNC: 107 MMOL/L (ref 95–110)
CO2 SERPL-SCNC: 21 MMOL/L (ref 23–29)
CREAT SERPL-MCNC: 0.7 MG/DL (ref 0.5–1.4)
CRP SERPL-MCNC: 6.9 MG/L (ref 0–8.2)
DAT IGG-SP REAG RBC-IMP: NORMAL
DIFFERENTIAL METHOD: ABNORMAL
EOSINOPHIL # BLD AUTO: 0.2 K/UL (ref 0–0.5)
EOSINOPHIL NFR BLD: 2.6 % (ref 0–8)
ERYTHROCYTE [DISTWIDTH] IN BLOOD BY AUTOMATED COUNT: 16.1 % (ref 11.5–14.5)
ERYTHROCYTE [SEDIMENTATION RATE] IN BLOOD BY WESTERGREN METHOD: 13 MM/HR (ref 0–36)
EST. GFR  (AFRICAN AMERICAN): >60 ML/MIN/1.73 M^2
EST. GFR  (NON AFRICAN AMERICAN): >60 ML/MIN/1.73 M^2
GLUCOSE SERPL-MCNC: 90 MG/DL (ref 70–110)
HCT VFR BLD AUTO: 37.8 % (ref 37–48.5)
HGB BLD-MCNC: 11.4 G/DL (ref 12–16)
IMM GRANULOCYTES # BLD AUTO: 0.02 K/UL (ref 0–0.04)
IMM GRANULOCYTES NFR BLD AUTO: 0.3 % (ref 0–0.5)
LYMPHOCYTES # BLD AUTO: 2.3 K/UL (ref 1–4.8)
LYMPHOCYTES NFR BLD: 35.1 % (ref 18–48)
MCH RBC QN AUTO: 23.8 PG (ref 27–31)
MCHC RBC AUTO-ENTMCNC: 30.2 G/DL (ref 32–36)
MCV RBC AUTO: 79 FL (ref 82–98)
MONOCYTES # BLD AUTO: 0.5 K/UL (ref 0.3–1)
MONOCYTES NFR BLD: 8.2 % (ref 4–15)
NEUTROPHILS # BLD AUTO: 3.5 K/UL (ref 1.8–7.7)
NEUTROPHILS NFR BLD: 53.2 % (ref 38–73)
NRBC BLD-RTO: 0 /100 WBC
PLATELET # BLD AUTO: 409 K/UL (ref 150–350)
PMV BLD AUTO: 10.4 FL (ref 9.2–12.9)
POTASSIUM SERPL-SCNC: 3.7 MMOL/L (ref 3.5–5.1)
PROT SERPL-MCNC: 7 G/DL (ref 6–8.4)
RBC # BLD AUTO: 4.78 M/UL (ref 4–5.4)
SODIUM SERPL-SCNC: 137 MMOL/L (ref 136–145)
WBC # BLD AUTO: 6.58 K/UL (ref 3.9–12.7)

## 2020-11-30 PROCEDURE — 86140 C-REACTIVE PROTEIN: CPT

## 2020-11-30 PROCEDURE — 86038 ANTINUCLEAR ANTIBODIES: CPT

## 2020-11-30 PROCEDURE — 99999 PR PBB SHADOW E&M-EST. PATIENT-LVL IV: CPT | Mod: PBBFAC,,,

## 2020-11-30 PROCEDURE — 36415 COLL VENOUS BLD VENIPUNCTURE: CPT

## 2020-11-30 PROCEDURE — 99205 PR OFFICE/OUTPT VISIT, NEW, LEVL V, 60-74 MIN: ICD-10-PCS | Mod: S$GLB,,,

## 2020-11-30 PROCEDURE — 86880 COOMBS TEST DIRECT: CPT

## 2020-11-30 PROCEDURE — 99205 OFFICE O/P NEW HI 60 MIN: CPT | Mod: S$GLB,,,

## 2020-11-30 PROCEDURE — 86334 IMMUNOFIX E-PHORESIS SERUM: CPT | Mod: 26,,, | Performed by: PATHOLOGY

## 2020-11-30 PROCEDURE — 86146 BETA-2 GLYCOPROTEIN ANTIBODY: CPT | Mod: 59

## 2020-11-30 PROCEDURE — 99999 PR PBB SHADOW E&M-EST. PATIENT-LVL IV: ICD-10-PCS | Mod: PBBFAC,,,

## 2020-11-30 PROCEDURE — 85025 COMPLETE CBC W/AUTO DIFF WBC: CPT

## 2020-11-30 PROCEDURE — 86160 COMPLEMENT ANTIGEN: CPT | Mod: 59

## 2020-11-30 PROCEDURE — 80053 COMPREHEN METABOLIC PANEL: CPT

## 2020-11-30 PROCEDURE — 86235 NUCLEAR ANTIGEN ANTIBODY: CPT | Mod: 59

## 2020-11-30 PROCEDURE — 85613 RUSSELL VIPER VENOM DILUTED: CPT

## 2020-11-30 PROCEDURE — 82595 ASSAY OF CRYOGLOBULIN: CPT

## 2020-11-30 PROCEDURE — 84165 PATHOLOGIST INTERPRETATION SPE: ICD-10-PCS | Mod: 26,,, | Performed by: PATHOLOGY

## 2020-11-30 PROCEDURE — 84165 PROTEIN E-PHORESIS SERUM: CPT | Mod: 26,,, | Performed by: PATHOLOGY

## 2020-11-30 PROCEDURE — 86147 CARDIOLIPIN ANTIBODY EA IG: CPT | Mod: 59

## 2020-11-30 PROCEDURE — 86160 COMPLEMENT ANTIGEN: CPT

## 2020-11-30 PROCEDURE — 86235 NUCLEAR ANTIGEN ANTIBODY: CPT

## 2020-11-30 PROCEDURE — 86039 ANTINUCLEAR ANTIBODIES (ANA): CPT

## 2020-11-30 PROCEDURE — 84165 PROTEIN E-PHORESIS SERUM: CPT

## 2020-11-30 PROCEDURE — 86334 IMMUNOFIX E-PHORESIS SERUM: CPT

## 2020-11-30 PROCEDURE — 86334 PATHOLOGIST INTERPRETATION IFE: ICD-10-PCS | Mod: 26,,, | Performed by: PATHOLOGY

## 2020-11-30 PROCEDURE — 86225 DNA ANTIBODY NATIVE: CPT

## 2020-11-30 PROCEDURE — 85652 RBC SED RATE AUTOMATED: CPT

## 2020-11-30 RX ORDER — HYDROXYCHLOROQUINE SULFATE 200 MG/1
400 TABLET, FILM COATED ORAL DAILY
Qty: 60 TABLET | Refills: 6 | Status: SHIPPED | OUTPATIENT
Start: 2020-11-30 | End: 2021-06-28

## 2020-11-30 RX ORDER — METHOCARBAMOL 500 MG/1
500 TABLET, FILM COATED ORAL
COMMUNITY

## 2020-11-30 RX ORDER — NAPROXEN 500 MG/1
TABLET ORAL
COMMUNITY
Start: 2020-11-10

## 2020-11-30 RX ORDER — PROCHLORPERAZINE MALEATE 10 MG
10 TABLET ORAL
COMMUNITY
End: 2021-01-25 | Stop reason: CLARIF

## 2020-11-30 RX ORDER — ONDANSETRON 4 MG/1
TABLET, FILM COATED ORAL
COMMUNITY
Start: 2020-11-10

## 2020-11-30 ASSESSMENT — SYSTEMIC LUPUS ERYTHEMATOSUS DISEASE ACTIVITY INDEX (SLEDAI): TOTAL_SCORE: 0

## 2020-11-30 NOTE — PROGRESS NOTES
Rapid3 Question Responses and Scores 11/29/2020   MDHAQ Score 0.2   Psychologic Score 5.5   Pain Score 8   When you awakened in the morning OVER THE LAST WEEK, did you feel stiff? Yes   If Yes, please indicate the number of hours until you are as limber as you will be for the day 1   Fatigue Score 10   Global Health Score 8   RAPID3 Score 5.55     Answers for HPI/ROS submitted by the patient on 11/29/2020   fever: No  eye redness: No  mouth sores: No  headaches: Yes  shortness of breath: Yes  chest pain: Yes  trouble swallowing: No  diarrhea: No  constipation: Yes  unexpected weight change: Yes  genital sore: No  dysuria: No  During the last 3 days, have you had a skin rash?: Yes  Bruises or bleeds easily: Yes  cough: Yes

## 2020-11-30 NOTE — PROGRESS NOTES
40 year old white female with   HTN,kidney stones  Father has systemic lupus    Sjogren's syndrome : diagnosed in 2010  TRICIA/SSA positive  Sicca syndrome  Fatigue ,hair thinning,dental issues/loss of teeth with dentures,joint pain and stiffness  Last seen in 2016  2 miscarriages first trimester 2012 and 2013  Was on plaquenil and d/jackelyn   Intermittent dose of steroids used  Now off medications for a year    Now comes with     Fatigue  Joint pain and stiffness in hands and knees  Sicca symptoms : optometry confirmed dry eyes and eye drops offered     Labs     TRICIA neg  RF neg    WPI 10  SSS 10  Depression and anxiety+  PTSD +     2016 Sjogren's syndrome classification criteria requires : 4/5 of     Positive SSA   Confirmation of focal lymphocytic sialadenitis with a focus score of =1 foci/4 mm2, each scoring 3   Ocular Staining Score of =5 (or van Bijsterveld score of =4)   Schirmer's test result of =5 mm/5 min   Unstimulated salivary flow rate of =0.1 mL/min, each scoring 1    PLAN    TRICIA,SSA  Complements  Cryos  Immunoglobulins  Pre dmard  Spep,RAFAEL  Urine analysis,UPC  APLAS   STEFANI  ESR,CRP  CBC,CMP    Answers for HPI/ROS submitted by the patient on 11/29/2020   fever: No  eye redness: No  mouth sores: No  headaches: Yes  shortness of breath: Yes  chest pain: Yes  trouble swallowing: No  diarrhea: No  constipation: Yes  unexpected weight change: Yes  genital sore: No  dysuria: No  During the last 3 days, have you had a skin rash?: Yes  Bruises or bleeds easily: Yes  cough: Yes    Ophthalmology    Plaquenil 200 mg bid  Eye exam     Psychiatry

## 2020-11-30 NOTE — ASSESSMENT & PLAN NOTE
40 year old AAF with previous dx of Sjogrens with weakly positive TRICIA/SSA, no record of biopsy, followed by Rheum in [place], last seen in 2016, previously on HCQ/on/off prednisone, last on over year ago.    Negative TRICIA and RF from 2019 in our records  WPI 10, SS 10    Assessment:  Sjogrens syndrome  Fibromyalgia  Depression  HTN      Plan:  -No definitive evidence at this moment to exclude SLE/sjogrens, will do full work up to start fresh as she is establishing here  -She also meets criteria for fibromyalgia, can try Cymbalta if Psych agrees, which would help both fibro and depression  -Referral to Ophtho and ENT for Sjogrens eval  -Referral to Psych for Depression/PTSD/Anxiety  -Continue follow up with PCP for HTN  -Ordered full Autoimmune labs today  -Recommend starting HCQ 400mg daily for now      RTC 3 months

## 2020-11-30 NOTE — PROGRESS NOTES
Subjective:       Patient ID: Kimmy De La Paz is a 40 y.o. female.    Chief Complaint: Disease Management    HPI   40 year old F here as new patient  Previously seen followed by Rheum in [place], last seen 2016 for Sjogrens syndrome clinically dx in 2010 based on weakly positive TRICIA/SSA and sicca symptoms. It was reported in the past she had symptoms of fatigue, hair thinning, issues with her teeth/cavities, one miscarriage showing clots in placenta.    Had been on HCQ but last stopped over year ago  Had steroid shots  Last eye doctor visit was 2019    Main issues right now is fatigue, joint pain/stiffness that lasts 1.5 hr, and sicca symptoms    PMH: HTN, Hx of kidney stones, hx of 2 miscarriage(1st trimester 2012, 1st trimester 2013)  PSH: tubal ligation, gallbladder  FH: Mother with DM, Dad- Lupus, brother with glaucoma  SH: has previously smoked, last was couple weeks ago, socially drinks alcohol, no other drugs  Allergies: latex - itching, ASA - tingling of lips/numbness, Penicillin - itchy       Widespread pain index   Note the areas which the patient has had pain over the last week:                    Shoulder-girdle, left 1               Shoulder-girdle, right 1                         Upper arm left 0                       Upper arm right 1                         Lower arm left 1                       Lower arm right 0    Hip (buttock, trochanter) left 0  Hip (buttock, trochanter) right 0                          Upper leg, left 0                        Upper leg, right 0                          Lower leg, left 1                        Lower leg, right 1                                   Jaw, left 0                                 Jaw, right 0                                       Chest 1                                 Abdomen 1                              Upper back 0                              Lower back 1                                        Neck 1  Score will be from 0-19: 10                                          Symptom severity score   Fatigue 2  Waking Unrefreshed 1  Cognitive Symptoms 3   0 = no problem, 1=slight or mild problem 2= moderate; considerable problems often present and/or at a moderate level, 3 = severe, pervasive, continuous, life disturbing problem   For each of the 3 symptoms, indicate the level of severity over the past week using the Scale.  The symptom severity score is the sum of the severity of the 3 symptoms (fatigue, waking unrefreshed, and cognitive symptoms) plus the number of the following symptoms occurring during the previous 6 months:   Headaches 1  Pain or cramps in the lower abdomen 1  Depression 2  The final score is between 0 and 12   10                                        Criteria   Patient has fibromyalgia if the following 3 conditions are met:   1.  Widespread pain index greater than or equal to 7 and symptom severity score greater than or equal to 5 or widespread pain index between 3- 6, and symptom severity score greater than or equal to 9.     2.  Symptoms have been present in a similar level for at least 3 months   3.  The patient does not have a disorder that would otherwise sufficiently explain the pain         Review of Systems   Constitutional: Positive for unexpected weight change. Negative for fever.   HENT: Negative for mouth sores and trouble swallowing.    Eyes: Negative for redness.   Respiratory: Negative for cough and shortness of breath.    Cardiovascular: Positive for chest pain (intermittent, normal stress test).   Gastrointestinal: Positive for constipation. Negative for diarrhea.   Genitourinary: Negative for dysuria and genital sores.   Skin: Positive for rash.   Neurological: Positive for headaches (intermittent, she believes likely related to anxiety/stress).   Hematological: Bruises/bleeds easily.         Objective:   BP (!) 154/97 (BP Location: Right arm, Patient Position: Sitting, BP Method: Medium (Automatic))   Pulse 92   Ht 5'  "2" (1.575 m)   Wt 92.2 kg (203 lb 4.2 oz)   BMI 37.18 kg/m²      Physical Exam   Constitutional: She is oriented to person, place, and time and well-developed, well-nourished, and in no distress.   HENT:   Head: Normocephalic and atraumatic.   Right Ear: External ear normal.   Left Ear: External ear normal.   Mouth/Throat: Oropharynx is clear and moist.   Dentures on top palate    Eyes: Conjunctivae and EOM are normal. Pupils are equal, round, and reactive to light.   Neck: Normal range of motion. Neck supple.   Cardiovascular: Normal rate, regular rhythm, normal heart sounds and intact distal pulses.    No murmur heard.  Pulmonary/Chest: Effort normal and breath sounds normal.   Neurological: She is alert and oriented to person, place, and time. No cranial nerve deficit. GCS score is 15.   Skin: Skin is warm and dry. No rash noted. No erythema.     Psychiatric: Mood, memory, affect and judgment normal.   Musculoskeletal: Normal range of motion. Tenderness and edema present. No deformity.      Comments: Diffuse tender points, left lower extremity larger than right due to chronic lymphedema per patient, no synovitis, no enthesitis, good strength, good ROM          No data to display     Assessment:       1. Sjogren's syndrome, with unspecified organ involvement    2. Depression, unspecified depression type            Plan:       Problem List Items Addressed This Visit        Immunology/Multi System    Sjogren's syndrome - Primary     40 year old AAF with previous dx of Sjogrens with weakly positive TRICIA/SSA, no record of biopsy, followed by Rheum in [place], last seen in 2016, previously on HCQ/on/off prednisone, last on over year ago.    Negative TRICIA and RF from 2019 in our records  WPI 10, SS 10    Assessment:  Sjogrens syndrome  Fibromyalgia  Depression  HTN      Plan:  -No definitive evidence at this moment to exclude SLE/sjogrens, will do full work up to start fresh as she is establishing here  -She also " meets criteria for fibromyalgia, can try Cymbalta if Psych agrees, which would help both fibro and depression  -Referral to Ophtho and ENT for Sjogrens eval  -Referral to Psych for Depression/PTSD/Anxiety  -Continue follow up with PCP for HTN  -Ordered full Autoimmune labs today  -Recommend starting HCQ 400mg daily for now      RTC 3 months           Relevant Orders    CBC Auto Differential    Comprehensive Metabolic Panel    C-Reactive Protein    Sedimentation rate    Protein/Creatinine Ratio, Urine    Urinalysis    Anti-DNA Ab, Double-Stranded    C3 Complement    C4 Complement    Direct antiglobulin test    TRICIA Screen w/Reflex    Beta-2 Glycoprotein Abs (IgA, IgG, IgM)    Cardiolipin antibody    LUPUS ANTICOAGULANT (DRVVT)    PROTEIN ELECTROPHORESIS, SERUM    CRYOGLOBULIN    Immunofixation Electrophoresis    Sjogrens syndrome-A extractable nuclear antibody    Ambulatory referral/consult to Ophthalmology    Ambulatory referral/consult to ENT    Ambulatory referral/consult to Psychiatry      Other Visit Diagnoses     Depression, unspecified depression type        Relevant Orders    Ambulatory referral/consult to Psychiatry

## 2020-11-30 NOTE — PROGRESS NOTES
I have reviewed the notes,documentation by  and I agree with his recommendations and I have made an addendum to his note

## 2020-12-01 LAB
ALBUMIN SERPL ELPH-MCNC: 3.75 G/DL (ref 3.35–5.55)
ALPHA1 GLOB SERPL ELPH-MCNC: 0.3 G/DL (ref 0.17–0.41)
ALPHA2 GLOB SERPL ELPH-MCNC: 0.63 G/DL (ref 0.43–0.99)
ANA PATTERN 1: NORMAL
ANA PATTERN 2: NORMAL
ANA SER QL IF: POSITIVE
ANA TITER 2: NORMAL
ANA TITR SER IF: NORMAL {TITER}
B-GLOBULIN SERPL ELPH-MCNC: 0.86 G/DL (ref 0.5–1.1)
DSDNA AB SER-ACNC: NORMAL [IU]/ML
GAMMA GLOB SERPL ELPH-MCNC: 1.26 G/DL (ref 0.67–1.58)
INTERPRETATION SERPL IFE-IMP: NORMAL
LA PPP-IMP: NEGATIVE
PATHOLOGIST INTERPRETATION IFE: NORMAL
PATHOLOGIST INTERPRETATION SPE: NORMAL
PROT SERPL-MCNC: 6.8 G/DL (ref 6–8.4)

## 2020-12-02 ENCOUNTER — OFFICE VISIT (OUTPATIENT)
Dept: INTERNAL MEDICINE | Facility: CLINIC | Age: 40
End: 2020-12-02
Payer: COMMERCIAL

## 2020-12-02 ENCOUNTER — HOSPITAL ENCOUNTER (OUTPATIENT)
Dept: CARDIOLOGY | Facility: HOSPITAL | Age: 40
Discharge: HOME | End: 2020-12-02
Attending: HOSPITALIST
Payer: COMMERCIAL

## 2020-12-02 VITALS
WEIGHT: 197.75 LBS | DIASTOLIC BLOOD PRESSURE: 88 MMHG | TEMPERATURE: 98 F | HEIGHT: 62 IN | SYSTOLIC BLOOD PRESSURE: 134 MMHG | RESPIRATION RATE: 16 BRPM | HEART RATE: 83 BPM | BODY MASS INDEX: 36.39 KG/M2

## 2020-12-02 DIAGNOSIS — R03.0 ELEVATED BP WITHOUT DIAGNOSIS OF HYPERTENSION: Primary | ICD-10-CM

## 2020-12-02 DIAGNOSIS — R00.2 PALPITATIONS: ICD-10-CM

## 2020-12-02 DIAGNOSIS — Z02.89 ENCOUNTER FOR COMPLETION OF FORM WITH PATIENT: ICD-10-CM

## 2020-12-02 DIAGNOSIS — M25.50 ARTHRALGIA, UNSPECIFIED JOINT: ICD-10-CM

## 2020-12-02 LAB
ANTI SM ANTIBODY: 0.07 RATIO (ref 0–0.99)
ANTI SM/RNP ANTIBODY: 0.09 RATIO (ref 0–0.99)
ANTI-SM INTERPRETATION: NEGATIVE
ANTI-SM/RNP INTERPRETATION: NEGATIVE
ANTI-SSA ANTIBODY: 0.84 RATIO (ref 0–0.99)
ANTI-SSA ANTIBODY: 0.84 RATIO (ref 0–0.99)
ANTI-SSA INTERPRETATION: NEGATIVE
ANTI-SSA INTERPRETATION: NEGATIVE
ANTI-SSB ANTIBODY: 0.07 RATIO (ref 0–0.99)
ANTI-SSB INTERPRETATION: NEGATIVE
DSDNA AB SER-ACNC: NORMAL [IU]/ML

## 2020-12-02 PROCEDURE — 1126F PR PAIN SEVERITY QUANTIFIED, NO PAIN PRESENT: ICD-10-PCS | Mod: S$GLB,,, | Performed by: HOSPITALIST

## 2020-12-02 PROCEDURE — 3008F BODY MASS INDEX DOCD: CPT | Mod: CPTII,S$GLB,, | Performed by: HOSPITALIST

## 2020-12-02 PROCEDURE — 3008F PR BODY MASS INDEX (BMI) DOCUMENTED: ICD-10-PCS | Mod: CPTII,S$GLB,, | Performed by: HOSPITALIST

## 2020-12-02 PROCEDURE — 93227 HOLTER MONITOR - 24 HOUR (CUPID ONLY): ICD-10-PCS | Mod: ,,, | Performed by: STUDENT IN AN ORGANIZED HEALTH CARE EDUCATION/TRAINING PROGRAM

## 2020-12-02 PROCEDURE — 1126F AMNT PAIN NOTED NONE PRSNT: CPT | Mod: S$GLB,,, | Performed by: HOSPITALIST

## 2020-12-02 PROCEDURE — 93227 XTRNL ECG REC<48 HR R&I: CPT | Mod: ,,, | Performed by: STUDENT IN AN ORGANIZED HEALTH CARE EDUCATION/TRAINING PROGRAM

## 2020-12-02 PROCEDURE — 99213 PR OFFICE/OUTPT VISIT, EST, LEVL III, 20-29 MIN: ICD-10-PCS | Mod: S$GLB,,, | Performed by: HOSPITALIST

## 2020-12-02 PROCEDURE — 93226 XTRNL ECG REC<48 HR SCAN A/R: CPT

## 2020-12-02 PROCEDURE — 99999 PR PBB SHADOW E&M-EST. PATIENT-LVL IV: CPT | Mod: PBBFAC,,, | Performed by: HOSPITALIST

## 2020-12-02 PROCEDURE — 99999 PR PBB SHADOW E&M-EST. PATIENT-LVL IV: ICD-10-PCS | Mod: PBBFAC,,, | Performed by: HOSPITALIST

## 2020-12-02 PROCEDURE — 99213 OFFICE O/P EST LOW 20 MIN: CPT | Mod: S$GLB,,, | Performed by: HOSPITALIST

## 2020-12-02 NOTE — PROGRESS NOTES
"Subjective:     @Patient ID: Kimmy De La Paz is a 40 y.o. female.    Chief Complaint: Follow-up    HPI    41 yo F presents for bp check. Was elevated at o/s ER. Pt did not bring log but reports bp readings have been sbp <135. Reports still having joint pain issues especially recently with cold weather. Also reports issues with prolonged sun exposure causing fatigue. She has requested ADA accommodations at her job as a . Reports unable to do outside work duties during extreme conditions as causes her pain and limits her mobility. Also recently had f/u with rheumatology  Reports recent HA after having blood work drawn few days ago. Otherwise doing ok      Review of Systems   Constitutional: Negative for chills and fever.   HENT: Negative for congestion and sore throat.    Eyes: Negative for pain and visual disturbance.   Respiratory: Negative for cough and shortness of breath.    Cardiovascular: Negative for chest pain and leg swelling.   Gastrointestinal: Negative for abdominal pain, nausea and vomiting.   Endocrine: Negative for polydipsia and polyuria.   Genitourinary: Negative for difficulty urinating and dysuria.   Musculoskeletal: Positive for arthralgias. Negative for back pain.   Skin: Negative for rash and wound.   Neurological: Positive for headaches. Negative for dizziness and weakness.   Psychiatric/Behavioral: Negative for agitation and confusion.     Past medical history, surgical history, and family medical history reviewed and updated as appropriate.    Medications and allergies reviewed.     Objective:     Vitals:    12/02/20 1054   BP: 134/88   Pulse: 83   Resp: 16   Temp: 97.7 °F (36.5 °C)   TempSrc: Oral   Weight: 89.7 kg (197 lb 12 oz)   Height: 5' 2" (1.575 m)     Body mass index is 36.17 kg/m².  Physical Exam  Vitals signs reviewed.   Constitutional:       General: She is not in acute distress.     Appearance: She is well-developed.   HENT:      Head: Normocephalic and " atraumatic.   Eyes:      General:         Right eye: No discharge.         Left eye: No discharge.      Conjunctiva/sclera: Conjunctivae normal.   Neck:      Musculoskeletal: Normal range of motion and neck supple.   Cardiovascular:      Rate and Rhythm: Normal rate and regular rhythm.      Heart sounds: No murmur. No friction rub.   Pulmonary:      Effort: Pulmonary effort is normal.      Breath sounds: Normal breath sounds.   Musculoskeletal:      Right lower leg: No edema.      Left lower leg: No edema.      Comments: LLE chronically larger than RLE   Skin:     General: Skin is warm and dry.   Neurological:      Mental Status: She is alert and oriented to person, place, and time.   Psychiatric:         Mood and Affect: Mood normal.         Behavior: Behavior normal.         Lab Results   Component Value Date    WBC 6.58 11/30/2020    HGB 11.4 (L) 11/30/2020    HCT 37.8 11/30/2020     (H) 11/30/2020    CHOL 172 11/29/2019    TRIG 100 11/29/2019    HDL 44 11/29/2019    ALT 11 11/30/2020    AST 16 11/30/2020     11/30/2020    K 3.7 11/30/2020     11/30/2020    CREATININE 0.7 11/30/2020    BUN 9 11/30/2020    CO2 21 (L) 11/30/2020    TSH 1.243 11/29/2019    HGBA1C 5.4 11/29/2019       Assessment:     1. Elevated BP without diagnosis of hypertension    2. Arthralgia, unspecified joint    3. Encounter for completion of form with patient      Plan:   Kimmy was seen today for follow-up.    Diagnoses and all orders for this visit:    Elevated BP without diagnosis of hypertension  - bp better controlled today though in prehypertension range. Pt counseled to continue to monitor bp. Notify MD if increases again    Arthralgia, unspecified joint  - has f/u with rheumatology     Encounter for completion of form with patient  - ADA form completed during visit     RTC for annual. Pt reports she will schedule     Nina Gordon MD  Internal Medicine    12/1/2020

## 2020-12-03 LAB
B2 GLYCOPROT1 IGA SER QL: <9 SAU
B2 GLYCOPROT1 IGG SER QL: <9 SGU
B2 GLYCOPROT1 IGM SER QL: <9 SMU
CARDIOLIPIN IGG SER IA-ACNC: <9.4 GPL (ref 0–14.99)
CARDIOLIPIN IGM SER IA-ACNC: <9.4 MPL (ref 0–12.49)
OHS CV EVENT MONITOR DAY: 0
OHS CV HOLTER LENGTH DECIMAL HOURS: 24
OHS CV HOLTER LENGTH HOURS: 24
OHS CV HOLTER LENGTH MINUTES: 0

## 2020-12-04 ENCOUNTER — OFFICE VISIT (OUTPATIENT)
Dept: CARDIOLOGY | Facility: CLINIC | Age: 40
End: 2020-12-04
Payer: COMMERCIAL

## 2020-12-04 VITALS
DIASTOLIC BLOOD PRESSURE: 90 MMHG | HEART RATE: 81 BPM | SYSTOLIC BLOOD PRESSURE: 132 MMHG | WEIGHT: 194.69 LBS | HEIGHT: 62 IN | BODY MASS INDEX: 35.83 KG/M2

## 2020-12-04 DIAGNOSIS — R07.89 OTHER CHEST PAIN: Primary | ICD-10-CM

## 2020-12-04 DIAGNOSIS — E66.9 OBESITY (BMI 30-39.9): ICD-10-CM

## 2020-12-04 DIAGNOSIS — R69 DIAGNOSIS DEFERRED: ICD-10-CM

## 2020-12-04 DIAGNOSIS — M35.00 SJOGREN'S SYNDROME, WITH UNSPECIFIED ORGAN INVOLVEMENT: ICD-10-CM

## 2020-12-04 DIAGNOSIS — R07.9 CHEST PAIN, UNSPECIFIED TYPE: ICD-10-CM

## 2020-12-04 PROCEDURE — 99205 OFFICE O/P NEW HI 60 MIN: CPT | Mod: S$GLB,,, | Performed by: INTERNAL MEDICINE

## 2020-12-04 PROCEDURE — 3008F PR BODY MASS INDEX (BMI) DOCUMENTED: ICD-10-PCS | Mod: CPTII,S$GLB,, | Performed by: INTERNAL MEDICINE

## 2020-12-04 PROCEDURE — 99999 PR PBB SHADOW E&M-EST. PATIENT-LVL V: ICD-10-PCS | Mod: PBBFAC,,, | Performed by: INTERNAL MEDICINE

## 2020-12-04 PROCEDURE — 99205 PR OFFICE/OUTPT VISIT, NEW, LEVL V, 60-74 MIN: ICD-10-PCS | Mod: S$GLB,,, | Performed by: INTERNAL MEDICINE

## 2020-12-04 PROCEDURE — 1125F PR PAIN SEVERITY QUANTIFIED, PAIN PRESENT: ICD-10-PCS | Mod: S$GLB,,, | Performed by: INTERNAL MEDICINE

## 2020-12-04 PROCEDURE — 93000 EKG 12-LEAD: ICD-10-PCS | Mod: S$GLB,,, | Performed by: INTERNAL MEDICINE

## 2020-12-04 PROCEDURE — 1125F AMNT PAIN NOTED PAIN PRSNT: CPT | Mod: S$GLB,,, | Performed by: INTERNAL MEDICINE

## 2020-12-04 PROCEDURE — 99999 PR PBB SHADOW E&M-EST. PATIENT-LVL V: CPT | Mod: PBBFAC,,, | Performed by: INTERNAL MEDICINE

## 2020-12-04 PROCEDURE — 93000 ELECTROCARDIOGRAM COMPLETE: CPT | Mod: S$GLB,,, | Performed by: INTERNAL MEDICINE

## 2020-12-04 PROCEDURE — 3008F BODY MASS INDEX DOCD: CPT | Mod: CPTII,S$GLB,, | Performed by: INTERNAL MEDICINE

## 2020-12-04 RX ORDER — ERGOCALCIFEROL 1.25 MG/1
50000 CAPSULE ORAL
COMMUNITY

## 2020-12-04 NOTE — LETTER
December 4, 2020      Nina Gordon MD  [address]  [place] [address]           [facility] 3rd Floor  [address]  [facility] [address]  Phone: [phone]          Patient: Kimmy De La Paz   MR Number: [id number]   YOB: 1980   Date of Visit: 12/4/2020       Dear Dr. Nina Gordon:    Thank you for referring Kimmy De La Paz to me for evaluation. Attached you will find relevant portions of my assessment and plan of care.    If you have questions, please do not hesitate to call me. I look forward to following Kimmy De La Paz along with you.    Sincerely,    Cliff De La Paz MD PhD    Enclosure  CC:  No Recipients    If you would like to receive this communication electronically, please contact [email or web address] or [phone] to request more information on [employer] Link access.    For providers and/or their staff who would like to refer a patient to [facility], please contact us through our one-stop-shop provider referral line, [facility], at [phone].    If you feel you have received this communication in error or would no longer like to receive these types of communications, please e-mail [email or web address]

## 2020-12-04 NOTE — PROGRESS NOTES
"[facility]      Chief Complaint   Patient presents with    Chest Pain       Patient ID: Kimmy De La Paz is a 40 y.o. female with Sjogren's, obesity, who presents for an initial appointment.  Pertinent history/events are as follows:    HPI:  Mrs. De La Paz reports chest pain starting 2 years ago.  Chest pain occurs at rest and on exertion.  Usually occurs at work during stressful situations.  Chest pain described as "a push or muscular pull", located at left breast area, radiating to left arm, 6/10 in intensity, lasting 15- 20 minutes.  Positive for associated nausea.  No smoking history.  Report family history of CAD with MI in maternal grandfather.  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  Twenty-four hour Holter monitor from 12/2/2020 revealed normal sinus rhythm with no significant arrhythmias noted.  Exercise Stress Test on 11/23/2020 is negative for ischemia.  The patient reported chest pain during the stress test.  EKG today shows normal sinus rhythm with no ischemic ST/T wave changes.    Past Medical History:   Diagnosis Date    Lupus     Rheumatoid arthritis     Sjoegren syndrome      Past Surgical History:   Procedure Laterality Date    CHOLECYSTECTOMY      DENTAL SURGERY      SINUS ENDOSCOPY      TUBAL LIGATION       Social History     Socioeconomic History    Marital status:      Spouse name: Not on file    Number of children: 3    Years of education: Not on file    Highest education level: Not on file   Occupational History    Occupation: Teacher   Social Needs    Financial resource strain: Somewhat hard    Food insecurity     Worry: Not on file     Inability: Never true    Transportation needs     Medical: No     Non-medical: No   Tobacco Use    Smoking status: Never Smoker    Smokeless tobacco: Never Used   Substance and Sexual Activity    Alcohol use: Yes     Comment: occ    Drug use: No    Sexual activity: Yes     Partners: Male     Birth " control/protection: Condom, See Surgical Hx   Lifestyle    Physical activity     Days per week: 1 day     Minutes per session: 30 min    Stress: Very much   Relationships    Social connections     Talks on phone: More than three times a week     Gets together: Once a week     Attends Rest[employer] service: Not on file     Active member of club or organization: Yes     Attends meetings of clubs or organizations: 1 to 4 times per year     Relationship status:    Other Topics Concern    Not on file   Social History Narrative    Not on file     Family History   Problem Relation Age of Onset    Diabetes Mother     Hypertension Mother     Glaucoma Mother     Lupus Father     Ovarian cancer Neg Hx     Colon cancer Neg Hx     Breast cancer Neg Hx     Macular degeneration Neg Hx        Review of patient's allergies indicates:   Allergen Reactions    Penicillins Itching    Aspirin Other (See Comments)    Latex Itching    Pollens extract Itching and Other (See Comments)       Medication List with Changes/Refills   Current Medications    CITALOPRAM (CELEXA) 10 MG TABLET    Take 1 tablet (10 mg total) by mouth once daily.    ERGOCALCIFEROL (ERGOCALCIFEROL) 50,000 UNIT CAP    Take 50,000 Units by mouth every 7 days.    HYDROXYCHLOROQUINE (PLAQUENIL) 200 MG TABLET    Take 2 tablets (400 mg total) by mouth once daily.    METHOCARBAMOL (ROBAXIN) 500 MG TAB    Take 500 mg by mouth as needed.    NAPROXEN (NAPROSYN) 500 MG TABLET    TK 1 T PO Q 12 H FOR UP TO 20 DOSES PRF PAIN    ONDANSETRON (ZOFRAN) 4 MG TABLET    TK 1 T PO Q 8 H FOR UP TO 7 DAYS PRF NAUSEA    PROCHLORPERAZINE (COMPAZINE) 10 MG TABLET    Take 10 mg by mouth as needed.   Discontinued Medications    TAMSULOSIN (FLOMAX) 0.4 MG CAP    Take 1 capsule (0.4 mg total) by mouth once daily.       Review of Systems  Constitution: Denies chills, fever, and sweats.  HENT: Denies headaches or blurry vision.  Cardiovascular: Denies chest pain or irregular heart  "beat.  Respiratory: Denies cough or shortness of breath.  Gastrointestinal: Denies abdominal pain, nausea, or vomiting.  Musculoskeletal: Denies muscle cramps.  Neurological: Denies dizziness or focal weakness.  Psychiatric/Behavioral: Normal mental status.  Hematologic/Lymphatic: Denies bleeding problem or easy bruising/bleeding.  Skin: Denies rash or suspicious lesions    Physical Examination  BP (!) 132/90   Pulse 81   Ht 5' 2" (1.575 m)   Wt 88.3 kg (194 lb 10.7 oz)   BMI 35.60 kg/m²     Constitutional: No acute distress, conversant  HEENT: Sclera anicteric, Pupils equal, round and reactive to light, extraocular motions intact, Oropharynx clear  Neck: No JVD, no carotid bruits  Cardiovascular: regular rate and rhythm, no murmur, rubs or gallops, normal S1/S2  Pulmonary: Clear to auscultation bilaterally  Abdominal: Abdomen soft, nontender, nondistended, positive bowel sounds  Extremities: No lower extremity edema,   Pulses:  Carotid pulses are 2+ on the right side, and 2+ on the left side.  Radial pulses are 2+ on the right side, and 2+ on the left side.   Femoral pulses are 2+ on the right side, and 2+ on the left side.  Popliteal pulses are 2+ on the right side, and 2+ on the left side.   Dorsalis pedis pulses are 2+ on the right side, and 2+ on the left side.   Posterior tibial pulses are 2+ on the right side, and 2+ on the left side.    Skin: No ecchymosis, erythema, or ulcers  Psych: Alert and oriented x 3, appropriate affect  Neuro: CNII-XII intact, no focal deficits    Labs:  Most Recent Data  CBC:   Lab Results   Component Value Date    WBC 6.58 11/30/2020    HGB 11.4 (L) 11/30/2020    HCT 37.8 11/30/2020     (H) 11/30/2020    MCV 79 (L) 11/30/2020    RDW 16.1 (H) 11/30/2020     BMP:   Lab Results   Component Value Date     11/30/2020    K 3.7 11/30/2020     11/30/2020    CO2 21 (L) 11/30/2020    BUN 9 11/30/2020    CREATININE 0.7 11/30/2020    GLU 90 11/30/2020    CALCIUM 8.3 (L) " 11/30/2020     LFTS;   Lab Results   Component Value Date    PROT 7.0 11/30/2020    ALBUMIN 3.7 11/30/2020    BILITOT 0.4 11/30/2020    AST 16 11/30/2020    ALKPHOS 73 11/30/2020    ALT 11 11/30/2020     COAGS: No results found for: INR, PROTIME, PTT  FLP:   Lab Results   Component Value Date    CHOL 172 11/29/2019    HDL 44 11/29/2019    LDLCALC 108.0 11/29/2019    TRIG 100 11/29/2019    CHOLHDL 25.6 11/29/2019     CARDIAC: No results found for: TROPONINI, CKMB, BNP      EKG 12/4/2020:  Normal sinus rhythm with no ischemic ST/T wave changes.      24 hour Holter monitor 12/2/2020:  · Normal sinus rhythm with no significant arrhythmias noted.    Exercise Stress Test 11/23/2020:  The EKG portion of this study is negative for ischemia.    The patient reported chest pain during the stress test.    The test was stopped because the patient experienced chest pain.    The blood pressure response to stress was normal.    There were no arrhythmias during stress.    The exercise capacity was above average.       Assessment/Plan:  Kimmy De La Paz is a 40 y.o. female with Sjogren's, obesity, who presents for an initial appointment.    1. Chest Pain- Etiology unclear.  Twenty-four hour Holter monitor from 12/2/2020 revealed normal sinus rhythm with no significant arrhythmias noted.  Exercise Stress Test on 11/23/2020 is negative for ischemia.  The patient reported chest pain during the stress test.  EKG today shows normal sinus rhythm with no ischemic ST/T wave changes.  Given history of Sjogren's and symptoms of chest pain during treadmill stress test, will check exercise stress echo to rule out pericardial effusion/structural heart disease and to couple imaging with stress.        2. ASCVD- Ten year ASCVD risk score low at 4%.      3. Obesity-  Refer to nutrition for assistance with weight loss.     Will call pt with results of stress echo  Follow up in 3 weeks    Total duration of face to face visit time 45  minutes.  Total time spent counseling greater than fifty percent of total visit time.  Counseling included discussion regarding imaging findings, diagnosis, possibilities, treatment options, risks and benefits.  The patient had many questions regarding the options and long-term effects.    Cliff De La Paz MD, PhD  Interventional Cardiology

## 2020-12-04 NOTE — PATIENT INSTRUCTIONS
Assessment/Plan:  Kimmy De La Paz is a 40 y.o. female with Sjogren's, obesity, who presents for an initial appointment.    1. Chest Pain- Etiology unclear.  Twenty-four hour Holter monitor from 12/2/2020 revealed normal sinus rhythm with no significant arrhythmias noted.  Exercise Stress Test on 11/23/2020 is negative for ischemia.  The patient reported chest pain during the stress test.  EKG today shows normal sinus rhythm with no ischemic ST/T wave changes.  Given history of Sjogren's and symptoms of chest pain during treadmill stress test, will check exercise stress echo to rule out pericardial effusion/structural heart disease and to couple imaging with stress.        2. ASCVD- Ten year ASCVD risk score low at 4%.      3. Obesity-  Refer to nutrition for assistance with weight loss.     Will call pt with results of stress echo  Follow up in 3 weeks

## 2020-12-10 LAB — CRYOGLOB SER QL: NORMAL

## 2021-01-20 DIAGNOSIS — Z12.31 OTHER SCREENING MAMMOGRAM: ICD-10-CM

## 2021-01-25 ENCOUNTER — HOSPITAL ENCOUNTER (EMERGENCY)
Facility: HOSPITAL | Age: 41
Discharge: HOME | End: 2021-01-25
Attending: EMERGENCY MEDICINE
Payer: COMMERCIAL

## 2021-01-25 ENCOUNTER — TELEPHONE (OUTPATIENT)
Dept: INTERNAL MEDICINE | Facility: CLINIC | Age: 41
End: 2021-01-25

## 2021-01-25 VITALS
HEART RATE: 85 BPM | WEIGHT: 190 LBS | BODY MASS INDEX: 34.96 KG/M2 | TEMPERATURE: 99 F | HEIGHT: 62 IN | OXYGEN SATURATION: 100 % | RESPIRATION RATE: 19 BRPM | DIASTOLIC BLOOD PRESSURE: 72 MMHG | SYSTOLIC BLOOD PRESSURE: 153 MMHG

## 2021-01-25 DIAGNOSIS — R53.83 FATIGUE, UNSPECIFIED TYPE: Primary | ICD-10-CM

## 2021-01-25 DIAGNOSIS — R07.9 CHEST PAIN: ICD-10-CM

## 2021-01-25 LAB
ALBUMIN SERPL BCP-MCNC: 3.4 G/DL (ref 3.5–5.2)
ALP SERPL-CCNC: 70 U/L (ref 55–135)
ALT SERPL W/O P-5'-P-CCNC: 14 U/L (ref 10–44)
ANION GAP SERPL CALC-SCNC: 8 MMOL/L (ref 8–16)
AST SERPL-CCNC: 15 U/L (ref 10–40)
BASOPHILS # BLD AUTO: 0.04 K/UL (ref 0–0.2)
BASOPHILS NFR BLD: 0.6 % (ref 0–1.9)
BILIRUB SERPL-MCNC: 0.2 MG/DL (ref 0.1–1)
BNP SERPL-MCNC: 30 PG/ML (ref 0–99)
BUN SERPL-MCNC: 9 MG/DL (ref 6–20)
CALCIUM SERPL-MCNC: 8.5 MG/DL (ref 8.7–10.5)
CHLORIDE SERPL-SCNC: 107 MMOL/L (ref 95–110)
CO2 SERPL-SCNC: 22 MMOL/L (ref 23–29)
CREAT SERPL-MCNC: 0.8 MG/DL (ref 0.5–1.4)
CTP QC/QA: YES
DIFFERENTIAL METHOD: ABNORMAL
EOSINOPHIL # BLD AUTO: 0.1 K/UL (ref 0–0.5)
EOSINOPHIL NFR BLD: 2.2 % (ref 0–8)
ERYTHROCYTE [DISTWIDTH] IN BLOOD BY AUTOMATED COUNT: 16.6 % (ref 11.5–14.5)
EST. GFR  (AFRICAN AMERICAN): >60 ML/MIN/1.73 M^2
EST. GFR  (NON AFRICAN AMERICAN): >60 ML/MIN/1.73 M^2
GLUCOSE SERPL-MCNC: 94 MG/DL (ref 70–110)
HCT VFR BLD AUTO: 35.9 % (ref 37–48.5)
HGB BLD-MCNC: 11.2 G/DL (ref 12–16)
IMM GRANULOCYTES # BLD AUTO: 0.02 K/UL (ref 0–0.04)
IMM GRANULOCYTES NFR BLD AUTO: 0.3 % (ref 0–0.5)
LYMPHOCYTES # BLD AUTO: 2.3 K/UL (ref 1–4.8)
LYMPHOCYTES NFR BLD: 37 % (ref 18–48)
MCH RBC QN AUTO: 23.9 PG (ref 27–31)
MCHC RBC AUTO-ENTMCNC: 31.2 G/DL (ref 32–36)
MCV RBC AUTO: 77 FL (ref 82–98)
MONOCYTES # BLD AUTO: 0.7 K/UL (ref 0.3–1)
MONOCYTES NFR BLD: 11.1 % (ref 4–15)
NEUTROPHILS # BLD AUTO: 3 K/UL (ref 1.8–7.7)
NEUTROPHILS NFR BLD: 48.8 % (ref 38–73)
NRBC BLD-RTO: 0 /100 WBC
PLATELET # BLD AUTO: 363 K/UL (ref 150–350)
PMV BLD AUTO: 11.2 FL (ref 9.2–12.9)
POTASSIUM SERPL-SCNC: 4 MMOL/L (ref 3.5–5.1)
PROT SERPL-MCNC: 6.9 G/DL (ref 6–8.4)
RBC # BLD AUTO: 4.68 M/UL (ref 4–5.4)
SARS-COV-2 RDRP RESP QL NAA+PROBE: NEGATIVE
SODIUM SERPL-SCNC: 137 MMOL/L (ref 136–145)
TROPONIN I SERPL DL<=0.01 NG/ML-MCNC: 0.01 NG/ML (ref 0–0.03)
WBC # BLD AUTO: 6.24 K/UL (ref 3.9–12.7)

## 2021-01-25 PROCEDURE — 83880 ASSAY OF NATRIURETIC PEPTIDE: CPT

## 2021-01-25 PROCEDURE — 85025 COMPLETE CBC W/AUTO DIFF WBC: CPT

## 2021-01-25 PROCEDURE — 93010 EKG 12-LEAD: ICD-10-PCS | Mod: ,,, | Performed by: INTERNAL MEDICINE

## 2021-01-25 PROCEDURE — 93010 ELECTROCARDIOGRAM REPORT: CPT | Mod: ,,, | Performed by: INTERNAL MEDICINE

## 2021-01-25 PROCEDURE — U0002 COVID-19 LAB TEST NON-CDC: HCPCS | Performed by: NURSE PRACTITIONER

## 2021-01-25 PROCEDURE — 80053 COMPREHEN METABOLIC PANEL: CPT

## 2021-01-25 PROCEDURE — 84484 ASSAY OF TROPONIN QUANT: CPT

## 2021-01-25 PROCEDURE — 93005 ELECTROCARDIOGRAM TRACING: CPT

## 2021-01-25 PROCEDURE — 99285 EMERGENCY DEPT VISIT HI MDM: CPT | Mod: 25

## 2021-01-26 ENCOUNTER — TELEPHONE (OUTPATIENT)
Dept: INTERNAL MEDICINE | Facility: CLINIC | Age: 41
End: 2021-01-26

## 2021-01-28 ENCOUNTER — PATIENT OUTREACH (OUTPATIENT)
Dept: ADMINISTRATIVE | Facility: OTHER | Age: 41
End: 2021-01-28

## 2021-01-29 ENCOUNTER — OFFICE VISIT (OUTPATIENT)
Dept: CARDIOLOGY | Facility: CLINIC | Age: 41
End: 2021-01-29
Payer: COMMERCIAL

## 2021-01-29 DIAGNOSIS — E66.9 OBESITY (BMI 30-39.9): ICD-10-CM

## 2021-01-29 DIAGNOSIS — R07.89 OTHER CHEST PAIN: Primary | ICD-10-CM

## 2021-01-29 DIAGNOSIS — M35.00 SJOGREN'S SYNDROME, WITH UNSPECIFIED ORGAN INVOLVEMENT: ICD-10-CM

## 2021-01-29 PROCEDURE — 99215 OFFICE O/P EST HI 40 MIN: CPT | Mod: 95,,, | Performed by: INTERNAL MEDICINE

## 2021-01-29 PROCEDURE — 99215 PR OFFICE/OUTPT VISIT, EST, LEVL V, 40-54 MIN: ICD-10-PCS | Mod: 95,,, | Performed by: INTERNAL MEDICINE

## 2021-02-23 ENCOUNTER — PATIENT MESSAGE (OUTPATIENT)
Dept: RHEUMATOLOGY | Facility: CLINIC | Age: 41
End: 2021-02-23

## 2021-03-26 ENCOUNTER — PATIENT OUTREACH (OUTPATIENT)
Dept: ADMINISTRATIVE | Facility: OTHER | Age: 41
End: 2021-03-26

## 2021-04-05 ENCOUNTER — PATIENT MESSAGE (OUTPATIENT)
Dept: ADMINISTRATIVE | Facility: HOSPITAL | Age: 41
End: 2021-04-05

## 2021-04-28 ENCOUNTER — PATIENT MESSAGE (OUTPATIENT)
Dept: RESEARCH | Facility: HOSPITAL | Age: 41
End: 2021-04-28

## 2021-07-06 ENCOUNTER — PATIENT MESSAGE (OUTPATIENT)
Dept: ADMINISTRATIVE | Facility: HOSPITAL | Age: 41
End: 2021-07-06

## 2021-09-29 ENCOUNTER — IMMUNIZATION (OUTPATIENT)
Dept: INTERNAL MEDICINE | Facility: CLINIC | Age: 41
End: 2021-09-29
Payer: OTHER GOVERNMENT

## 2021-09-29 DIAGNOSIS — Z23 NEED FOR VACCINATION: Primary | ICD-10-CM

## 2021-09-29 PROCEDURE — 91300 COVID-19, MRNA, LNP-S, PF, 30 MCG/0.3 ML DOSE VACCINE: CPT | Mod: PBBFAC

## 2021-10-04 ENCOUNTER — PATIENT MESSAGE (OUTPATIENT)
Dept: ADMINISTRATIVE | Facility: HOSPITAL | Age: 41
End: 2021-10-04

## 2021-10-22 ENCOUNTER — IMMUNIZATION (OUTPATIENT)
Dept: INTERNAL MEDICINE | Facility: CLINIC | Age: 41
End: 2021-10-22
Payer: OTHER GOVERNMENT

## 2021-10-22 DIAGNOSIS — Z23 NEED FOR VACCINATION: Primary | ICD-10-CM

## 2021-10-22 PROCEDURE — 0002A COVID-19, MRNA, LNP-S, PF, 30 MCG/0.3 ML DOSE VACCINE: CPT | Mod: PBBFAC | Performed by: INTERNAL MEDICINE

## 2021-10-22 PROCEDURE — 91300 COVID-19, MRNA, LNP-S, PF, 30 MCG/0.3 ML DOSE VACCINE: CPT | Mod: PBBFAC

## 2022-01-25 ENCOUNTER — PATIENT MESSAGE (OUTPATIENT)
Dept: ADMINISTRATIVE | Facility: HOSPITAL | Age: 42
End: 2022-01-25

## 2022-03-16 ENCOUNTER — PATIENT MESSAGE (OUTPATIENT)
Dept: ADMINISTRATIVE | Facility: HOSPITAL | Age: 42
End: 2022-03-16

## 2022-03-23 DIAGNOSIS — Z12.31 OTHER SCREENING MAMMOGRAM: ICD-10-CM

## 2022-06-01 ENCOUNTER — PATIENT MESSAGE (OUTPATIENT)
Dept: ADMINISTRATIVE | Facility: HOSPITAL | Age: 42
End: 2022-06-01

## 2022-07-12 ENCOUNTER — PATIENT MESSAGE (OUTPATIENT)
Dept: ADMINISTRATIVE | Facility: HOSPITAL | Age: 42
End: 2022-07-12

## 2022-08-24 ENCOUNTER — PATIENT MESSAGE (OUTPATIENT)
Dept: ADMINISTRATIVE | Facility: HOSPITAL | Age: 42
End: 2022-08-24

## 2022-10-12 ENCOUNTER — PATIENT MESSAGE (OUTPATIENT)
Dept: ADMINISTRATIVE | Facility: HOSPITAL | Age: 42
End: 2022-10-12

## 2022-11-09 ENCOUNTER — OFFICE VISIT (OUTPATIENT)
Dept: INTERNAL MEDICINE | Facility: CLINIC | Age: 42
End: 2022-11-09

## 2022-11-09 DIAGNOSIS — F41.9 ANXIETY AND DEPRESSION: Primary | ICD-10-CM

## 2022-11-09 DIAGNOSIS — F32.A ANXIETY AND DEPRESSION: Primary | ICD-10-CM

## 2022-11-09 PROCEDURE — 99213 OFFICE O/P EST LOW 20 MIN: CPT | Mod: 95,,, | Performed by: HOSPITALIST

## 2022-11-09 PROCEDURE — 99213 PR OFFICE/OUTPT VISIT, EST, LEVL III, 20-29 MIN: ICD-10-PCS | Mod: 95,,, | Performed by: HOSPITALIST

## 2022-11-09 RX ORDER — SERTRALINE HYDROCHLORIDE 25 MG/1
25 TABLET, FILM COATED ORAL DAILY
Qty: 30 TABLET | Refills: 11 | Status: SHIPPED | OUTPATIENT
Start: 2022-11-09 | End: 2023-11-09

## 2022-11-09 NOTE — PROGRESS NOTES
Virtual Visit  The patient location is: [place]   The chief complaint leading to consultation is: anxiety depression  Visit type: Virtual visit with synchronous audio and video  Total time spent with patient: 10 min  Each patient to whom he or she provides medical services by telemedicine is:  (1) informed of the relationship between the physician and patient and the respective role of any other health care provider with respect to management of the patient; and (2) notified that he or she may decline to receive medical services by telemedicine and may withdraw from such care at any time.    Subjective:         @Patient ID: Kimmy De La Paz is a 42 y.o. female.    Chief Complaint: Anxiety and Depression    HPI  43 yo F presents for evaluation of depression/anxiety over the past several weeks    Reports having anxiety, brain fog, mood swings. Reports with driving her mind went blank and had to pull over.   Reports had to take off from work due to her symptoms   Reports feels like her vision goes out.     Reports she took off all of last week. Returned to work 2 days ago.  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     Reports citalopram made her drowsy  Has not established with a psychiatrist.     Has tried meditation, soothing music.     Reports she is not sleeping well. Feels depressed. Has racing thoughts. Has decreased appetite   Thinks has had 2 panic attacks at work.       Review of Systems   Constitutional:  Negative for chills and fever.   HENT:  Negative for congestion and sore throat.    Respiratory:  Negative for cough and shortness of breath.    Cardiovascular:  Negative for chest pain and leg swelling.   Gastrointestinal:  Negative for abdominal pain, nausea and vomiting.   Musculoskeletal:  Negative for arthralgias and back pain.   Neurological:  Negative for dizziness, weakness and headaches.   Psychiatric/Behavioral:  Positive for dysphoric mood. Negative for agitation and confusion. The  patient is nervous/anxious.    Past medical history, surgical history, and family medical history reviewed and updated as appropriate.    Medications and allergies reviewed.     Objective:     There were no vitals filed for this visit.  There is no height or weight on file to calculate BMI.  Physical Exam  Constitutional:       Appearance: Normal appearance.   HENT:      Head: Normocephalic and atraumatic.   Pulmonary:      Effort: Pulmonary effort is normal.   Neurological:      Mental Status: She is alert and oriented to person, place, and time.   Psychiatric:         Mood and Affect: Mood is anxious and depressed.         Behavior: Behavior normal.       Lab Results   Component Value Date    WBC 6.24 01/25/2021    HGB 11.2 (L) 01/25/2021    HCT 35.9 (L) 01/25/2021     (H) 01/25/2021    CHOL 172 11/29/2019    TRIG 100 11/29/2019    HDL 44 11/29/2019    ALT 14 01/25/2021    AST 15 01/25/2021     01/25/2021    K 4.0 01/25/2021     01/25/2021    CREATININE 0.8 01/25/2021    BUN 9 01/25/2021    CO2 22 (L) 01/25/2021    TSH 1.243 11/29/2019    HGBA1C 5.4 11/29/2019       Assessment:     1. Anxiety and depression      Plan:   Kimmy was seen today for anxiety and depression.    Diagnoses and all orders for this visit:    Anxiety and depression  -     Ambulatory referral/consult to Psychiatry; Future    Other orders  -     sertraline (ZOLOFT) 25 MG tablet; Take 1 tablet (25 mg total) by mouth once daily.    Recommend to start sertraline  Recommend referral to establish care with psychiatry.  Rtc in 2-3 months for f/u and annual      No follow-ups on file.    Nina Gordon MD  Internal Medicine    11/9/2022

## 2022-11-10 ENCOUNTER — TELEPHONE (OUTPATIENT)
Dept: INTERNAL MEDICINE | Facility: CLINIC | Age: 42
End: 2022-11-10

## 2022-11-10 DIAGNOSIS — E66.9 OBESITY (BMI 30-39.9): ICD-10-CM

## 2022-11-10 DIAGNOSIS — R03.0 ELEVATED BP WITHOUT DIAGNOSIS OF HYPERTENSION: ICD-10-CM

## 2022-11-10 DIAGNOSIS — Z00.00 ANNUAL PHYSICAL EXAM: Primary | ICD-10-CM

## 2024-08-21 NOTE — PROGRESS NOTES
Onset: 3 days ago       Location / description: pt states feeling better since taking xifaxan. Pt states only having 2 episodes of diarrhea. Pt states starting this med last Tuesday. For the past 3 days pt is experiencing increased pain in her knees and bilat hands and HA.Pt has chronic knee pain but not sure if this new med is causing her to have increased pain. Pt is 5 days from finishing her meds. Denies any fever. Pt states her sx are comparable to when a pt takes statins.    Precipitating Factors: xifaxan    Pain Scale (1-10), 10 highest: 8/10 pain to bilat knees when walking    What improves/worsens symptoms: xifaxan is helping with her GI sx    Symptom specific medications: xifaxan    LMP : Only if pertains to symptom. N/A     Are you pregnant or breast feeding: N/A    Recent visits (last 3-4 weeks) for same reason or recent surgery:  no recent surgeries.    Dr. oNé Zamora paged and returned call. Informed Dr. Zamora of pt sx. Dr. Zamora states pt can stop taking Xifaxan and no further orders.      PLAN:    Paged Provider    Patient/Caller agrees to follow recommendations. and Patient/Caller encouraged to call back if any questions or concerns.    Pt states if her sx worsen she will go to the ER for evaluation.   Reason for Disposition   [1] Can't move swollen joint at all AND [2] no fever    Protocols used: Knee Pain-A-AH     Subjective:     @Patient ID: Kimmy De La Paz is a 39 y.o. female.    Chief Complaint: Annual Exam    HPI    40 yo F with pmhx of Sjogren's syndrome, raynauds, lupus, RA presents for annual exam. Pt is new to me. She is accompanied by her . Used to see rheumatology at [facility]. Pt reports she is not on any medications currently for her autoimmune disorders. Reports she does not like to take medication if possible.     Dr Loc John pcp     Lipid disorders/ASCVD risk (ages >/= 45 or >/= 20 if increased risk ): ordered  DM (>45y yearly or if obese, HTN): A1c ordered  Eye exam:   Breast Cancer (40-50y discretion of pt, 50-74y every 1-2 years): Mammogram Due  Cervical Cancer (Pap Smear ages 21-65 every 3 years or Pap + HPV q5 years after 30 years of age): Due       Vaccines:   Influenza (yearly) pt declines   Tetanus (every 10 yrs - 1st tdap)  due  PCV13 (> 65 or <65 w/ immunocompromised)  defers       Exercise: none  Diet:regular        Review of Systems   Constitutional: Negative for chills and fever.   HENT: Negative for congestion and sore throat.    Eyes: Negative for pain and visual disturbance.   Respiratory: Negative for cough and shortness of breath.    Cardiovascular: Negative for chest pain and leg swelling.   Gastrointestinal: Negative for abdominal pain, nausea and vomiting.   Endocrine: Negative for polydipsia and polyuria.   Genitourinary: Negative for difficulty urinating and dysuria.   Musculoskeletal: Negative for arthralgias and back pain.   Skin: Negative for rash and wound.   Neurological: Negative for dizziness, weakness and headaches.   Psychiatric/Behavioral: Negative for agitation and confusion.     Past medical history, surgical history, and family medical history reviewed and updated as appropriate.    Medications and allergies reviewed.     Objective:     Vitals:    11/27/19 1416   BP: 116/80   Pulse: 81   Resp: 16   Temp: 98.7 °F (37.1 °C)  "  TempSrc: Oral   Weight: 86 kg (189 lb 9.5 oz)   Height: 5' 2" (1.575 m)     Body mass index is 34.68 kg/m².  Physical Exam   Constitutional: She is oriented to person, place, and time. She appears well-developed and well-nourished. No distress.   HENT:   Head: Normocephalic and atraumatic.   Right Ear: External ear normal.   Left Ear: External ear normal.   Mouth/Throat: Oropharynx is clear and moist. No oropharyngeal exudate.   Eyes: Conjunctivae are normal. Right eye exhibits no discharge. Left eye exhibits no discharge.   Neck: Normal range of motion. Neck supple.   Cardiovascular: Normal rate, regular rhythm and intact distal pulses. Exam reveals no friction rub.   No murmur heard.  Pulmonary/Chest: Effort normal and breath sounds normal.   Abdominal: Soft. Bowel sounds are normal. She exhibits no distension. There is no tenderness. There is no guarding.   Musculoskeletal: Normal range of motion. She exhibits no edema.   Lymphadenopathy:     She has no cervical adenopathy.   Neurological: She is alert and oriented to person, place, and time.   Skin: Skin is warm and dry.   Psychiatric: She has a normal mood and affect. Her behavior is normal.   Vitals reviewed.      No results found for: WBC, HGB, HCT, PLT, CHOL, TRIG, HDL, LDLDIRECT, ALT, AST, NA, K, CL, CREATININE, BUN, CO2, TSH, PSA, INR, GLUF, HGBA1C, MICROALBUR    Assessment:     1. Annual physical exam    2. Breast cancer screening    3. Sjogren's syndrome, with unspecified organ involvement    4. Systemic lupus erythematosus, unspecified SLE type, unspecified organ involvement status    5. Cervical cancer screening    6. Vision test    7. BMI 34.0-34.9,adult      Plan:   Kimmy was seen today for annual exam.    Diagnoses and all orders for this visit:    Annual physical exam  -     Comprehensive metabolic panel; Future  -     CBC auto differential; Future  -     TSH; Future  -     Vitamin D; Future  -     Lipid panel; Future  -     HIV 1/2 Ag/Ab (4th " Gen); Future  -     Urinalysis; Future  -     HEMOGLOBIN A1C; Future    Breast cancer screening  -     Mammo Digital Screening Bilat w/ Gurpreet; Future    Sjogren's syndrome, with unspecified organ involvement  -     Ambulatory Referral to Rheumatology  -     Rheumatoid factor; Future  -     TRICIA Screen w/Reflex; Future    Systemic lupus erythematosus, unspecified SLE type, unspecified organ involvement status  -     Rheumatoid factor; Future  -     TRICIA Screen w/Reflex; Future    Cervical cancer screening  -     Ambulatory Referral to Obstetrics / Gynecology    Vision test  -     Ambulatory Referral to Optometry    BMI 34.0-34.9,adult         - Encourage healthy diet and exercise     Other orders  -     (In Office Administered) Tdap Vaccine          Follow up in about 6 months (around 5/27/2020), or if symptoms worsen or fail to improve.    Nina Gordon MD  Internal Medicine    11/27/2019

## 2024-10-23 ENCOUNTER — PATIENT MESSAGE (OUTPATIENT)
Dept: GASTROENTEROLOGY | Facility: CLINIC | Age: 44
End: 2024-10-23

## 2024-11-08 ENCOUNTER — OFFICE VISIT (OUTPATIENT)
Dept: INTERNAL MEDICINE | Facility: CLINIC | Age: 44
End: 2024-11-08
Payer: COMMERCIAL

## 2024-11-08 DIAGNOSIS — J01.00 ACUTE NON-RECURRENT MAXILLARY SINUSITIS: ICD-10-CM

## 2024-11-08 DIAGNOSIS — J06.9 UPPER RESPIRATORY TRACT INFECTION, UNSPECIFIED TYPE: Primary | ICD-10-CM

## 2024-11-08 DIAGNOSIS — Z29.9 PREVENTIVE MEASURE: ICD-10-CM

## 2024-11-08 PROCEDURE — 99214 OFFICE O/P EST MOD 30 MIN: CPT | Mod: 95,,, | Performed by: NURSE PRACTITIONER

## 2024-11-08 PROCEDURE — 1160F RVW MEDS BY RX/DR IN RCRD: CPT | Mod: CPTII,95,, | Performed by: NURSE PRACTITIONER

## 2024-11-08 PROCEDURE — 1159F MED LIST DOCD IN RCRD: CPT | Mod: CPTII,95,, | Performed by: NURSE PRACTITIONER

## 2024-11-08 RX ORDER — AZITHROMYCIN 250 MG/1
TABLET, FILM COATED ORAL
Qty: 6 TABLET | Refills: 0 | Status: SHIPPED | OUTPATIENT
Start: 2024-11-08 | End: 2024-11-13

## 2024-11-08 RX ORDER — LEVOCETIRIZINE DIHYDROCHLORIDE 5 MG/1
5 TABLET, FILM COATED ORAL NIGHTLY
Qty: 30 TABLET | Refills: 2 | Status: SHIPPED | OUTPATIENT
Start: 2024-11-08

## 2024-11-08 RX ORDER — FLUTICASONE PROPIONATE 50 MCG
1 SPRAY, SUSPENSION (ML) NASAL DAILY
Qty: 16 G | Refills: 2 | Status: SHIPPED | OUTPATIENT
Start: 2024-11-08

## 2024-11-12 ENCOUNTER — OFFICE VISIT (OUTPATIENT)
Dept: INTERNAL MEDICINE | Facility: CLINIC | Age: 44
End: 2024-11-12
Payer: COMMERCIAL

## 2024-11-12 DIAGNOSIS — R05.9 COUGH, UNSPECIFIED TYPE: Primary | ICD-10-CM

## 2024-11-12 PROCEDURE — 1159F MED LIST DOCD IN RCRD: CPT | Mod: CPTII,95,, | Performed by: NURSE PRACTITIONER

## 2024-11-12 PROCEDURE — 99214 OFFICE O/P EST MOD 30 MIN: CPT | Mod: 95,,, | Performed by: NURSE PRACTITIONER

## 2024-11-12 PROCEDURE — 1160F RVW MEDS BY RX/DR IN RCRD: CPT | Mod: CPTII,95,, | Performed by: NURSE PRACTITIONER

## 2024-11-12 RX ORDER — ALBUTEROL SULFATE 90 UG/1
2 INHALANT RESPIRATORY (INHALATION) 4 TIMES DAILY PRN
Qty: 18 G | Refills: 0 | Status: SHIPPED | OUTPATIENT
Start: 2024-11-12 | End: 2025-11-12

## 2024-11-12 RX ORDER — BENZONATATE 100 MG/1
100 CAPSULE ORAL 3 TIMES DAILY PRN
Qty: 30 CAPSULE | Refills: 0 | Status: SHIPPED | OUTPATIENT
Start: 2024-11-12 | End: 2024-11-22

## 2024-11-12 NOTE — PROGRESS NOTES
"The patient location is: [place]  The chief complaint leading to consultation is: URI symptoms    Visit type: audiovisual    Subjective:  HPI: History of Present Illness    CHIEF COMPLAINT:  Patient presents today for sinus infection symptoms.    SINUS INFECTION SYMPTOMS:  She initially experienced improvement with OTC medication, but symptoms subsequently returned. She reports sinus pressure, particularly in the facial area, and changes in mucus color from clear to greenish-yellow, now yellowish with some clear. She has experienced loss of voice and a cough, which is worse at night. She also reports a sore throat, more severe in the mornings. Her symptoms worsen when exposed to cold air, as experienced during a recent football game. She denies significant headache, mentioning only a minor headache initially.    MEDICAL HISTORY:  She has a history of lupus, which occasionally causes her to lose her voice. She reports a possible penicillin allergy, stating that an antibiotic used to make her "really, really itchy," though she does not recall the specific name. She denies experiencing itching with penicillin.      ROS:  Head: -headaches  ENT: +sore throat  Respiratory: +cough       Physical Exam  Pt does not appear to be in any acute distress.   Sounds congested without any SOB    Plan:  Assessment & Plan    IMPRESSION:  Diagnosed sinus infection based on symptoms including mucus production, sinus pressure, loss of voice, and cough  Recommend antibiotic treatment due to 2-week duration and symptom recurrence  Suggested Flonase nasal spray to reduce sinus swelling and facilitate drainage  Advised antihistamine to help dry up mucus  Considered potential for yeast infection as a side effect of antibiotic use    SINUS INFECTION:  - Explained sinus infection development: viral infection leads to mucus production and sinus swelling, creating environment for bacterial growth.  - Described mechanism of postnasal drip causing sore " throat, especially in the morning.  - Started antibiotic.  - Started Flonase nasal spray.  - Started antihistamine.    POTENTIAL ANTIBIOTIC SIDE EFFECTS:  - Discussed potential antibiotic side effect of vaginal yeast infection due to disruption of normal bacterial braulio.  - Contact the office if experiencing vaginal itching or cottage cheese-like discharge after completing antibiotic course for potential yeast infection treatment.       Face to Face time with patient: 7 minutes of total time spent on the encounter, which includes face to face time and non-face to face time preparing to see the patient (eg, review of tests), Obtaining and/or reviewing separately obtained history, Documenting clinical information in the electronic or other health record, Independently interpreting results (not separately reported) and communicating results to the patient/family/caregiver, or Care coordination (not separately reported).     Each patient to whom he or she provides medical services by telemedicine is:  (1) informed of the relationship between the physician and patient and the respective role of any other health care provider with respect to management of the patient; and (2) notified that he or she may decline to receive medical services by telemedicine and may withdraw from such care at any time.      Answers submitted by the patient for this visit:  Sore Throat Questionnaire (Submitted on 11/8/2024)  Chief Complaint: Sore throat  Chronicity: recurrent  Onset: yesterday  Progression since onset: rapidly worsening  Pain worse on: neither  Fever: 100 - 100.9 F  Fever duration: 3 to 4 days  Pain - numeric: 7/10  abdominal pain: No  congestion: Yes  cough: Yes  diarrhea: Yes  drooling: No  ear discharge: Yes  ear pain: Yes  headaches: Yes  hoarse voice: Yes  neck pain: Yes  plugged ear sensation: Yes  stridor: No  shortness of breath: Yes  swollen glands: Yes  trouble swallowing: Yes  vomiting: No  Treatments tried: NSAIDs,  cool liquids, gargles  Improvement on treatment: mild  Pain severity: moderate

## 2024-11-13 ENCOUNTER — HOSPITAL ENCOUNTER (OUTPATIENT)
Dept: RADIOLOGY | Facility: HOSPITAL | Age: 44
Discharge: HOME | End: 2024-11-13
Attending: NURSE PRACTITIONER
Payer: COMMERCIAL

## 2024-11-13 DIAGNOSIS — R05.9 COUGH, UNSPECIFIED TYPE: ICD-10-CM

## 2024-11-13 PROCEDURE — 71045 X-RAY EXAM CHEST 1 VIEW: CPT | Mod: 26,,, | Performed by: RADIOLOGY

## 2024-11-13 PROCEDURE — 71045 X-RAY EXAM CHEST 1 VIEW: CPT | Mod: TC,PO

## 2024-11-20 NOTE — PROGRESS NOTES
The patient location is: [place]  The chief complaint leading to consultation is: URI symptoms    Visit type: audiovisual    Subjective:  HPI: .History of Present Illness    CHIEF COMPLAINT:  Patient presents for follow-up of ongoing symptoms related to a sinus infection, including persistent cough and shortness of breath.    HPI:  Patient initially presented with symptoms of a sinus infection approximately 2 weeks ago. She was seen by the practitioner on the 8th and prescribed antibiotics, an antihistamine, and Flonase. Since starting treatment, she reports improvement in mucus production, especially upon waking. 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 Patient also has intermittent voice loss, which she attributes to her lupus.    Yesterday, the patient had a severe headache with pressure originating in her neck and radiating down the right side of her body to her shoulder blade, along with fatigue causing her to remain in bed. She feels improved this morning after expectorating a significant amount of mucus.    Patient has a history of exercise-induced asthma from her time in the , for which she was previously prescribed an albuterol inhaler. She has not used an inhaler in years. The cough is worse at night. Patient denies current sinus pressure or headache.    MEDICATIONS:  Patient is on an antihistamine, Flonase, and an antibiotic, all prescribed on 08/08/2023 for a sinus infection. The antibiotic course is expected to end today or within the next two days.    MEDICAL HISTORY:  Patient has a history of exercise-induced asthma and lupus.    FAMILY HISTORY:  Family history is significant for the patient's  and daughter with sinus infections.    IMAGING:  Patient underwent a chest XR, and the results are currently pending.    SOCIAL HISTORY:  Patient works as a teacher at [employer]. She has a history of   service.      ROS:  Constitutional: +fatigue  Head: -headaches  Respiratory: +shortness of breath, +cough         Physical Exam  Pt continues to sound congested    Plan:  Assessment & Plan    IMPRESSION:  Assessed ongoing symptoms of cough and shortness of breath following sinus infection treatment  Considered possibility of lung spasms due to upper respiratory infection causing persistent cough  Ordered chest XR to rule out any underlying pulmonary issues  Recommend albuterol inhaler to address cough and potential lung spasms  Prescribed Tessalon Pearls for additional cough relief    PATIENT EDUCATION:  - Explained that albuterol inhaler may cause increased heart rate as a normal side effect  - Discussed that cough is often worse at night    MEDICATIONS:  - Started albuterol inhaler: Use every 4-6 hours as needed for coughing fits  - Started Tessalon Pearls: Take 3 tablets 3 times daily  - Continued antibiotic: Complete course, likely 1-2 days remaining  - Continued Flonase (nasal spray)  - Continued antihistamine    ORDERS:  - Chest XR ordered    FOLLOW UP:  - Follow up at the clinic on [facility] on a walk-in basis for chest XR, latest time 4:30 PM today or tomorrow afternoon if unable to go today       Face to Face time with patient: 24   minutes of total time spent on the encounter, which includes face to face time and non-face to face time preparing to see the patient (eg, review of tests), Obtaining and/or reviewing separately obtained history, Documenting clinical information in the electronic or other health record, Independently interpreting results (not separately reported) and communicating results to the patient/family/caregiver, or Care coordination (not separately reported).     Each patient to whom he or she provides medical services by telemedicine is:  (1) informed of the relationship between the physician and patient and the respective role of any other health care provider  with respect to management of the patient; and (2) notified that he or she may decline to receive medical services by telemedicine and may withdraw from such care at any time.      Answers submitted by the patient for this visit:  Shortness of Breath Questionnaire (Submitted on 11/12/2024)  Chief Complaint: Shortness of breath  Chronicity: recurrent  Onset: in the past 7 days  Frequency: rarely  Progression since onset: gradually improving  Episode duration: 1 Minutes  abdominal pain: No  chest pain: No  hemoptysis: No  sputum production: Yes  PND: Yes  ear pain: Yes  syncope: No  fever: No  headaches: Yes  claudication: No  leg pain: No  leg swelling: Yes  neck pain: Yes  rash: No  rhinorrhea: Yes  orthopnea: No  sore throat: Yes  coryza: Yes  swollen glands: No  vomiting: No  wheezing: No  Aggravating factors: URIs  Treatments tried: OTC cough suppressants  asthma: No  allergies: No  COPD: No  pneumonia: No  aspirin allergies: Yes  CAD: No  DVT: No  heart failure: No  PE: No  recent surgery: No  bronchiolitis: No  chronic lung disease: No

## 2025-08-18 ENCOUNTER — OFFICE VISIT (OUTPATIENT)
Dept: INTERNAL MEDICINE | Facility: CLINIC | Age: 45
End: 2025-08-18
Payer: COMMERCIAL

## 2025-08-18 ENCOUNTER — LAB VISIT (OUTPATIENT)
Dept: LAB | Facility: HOSPITAL | Age: 45
End: 2025-08-18
Attending: NURSE PRACTITIONER
Payer: COMMERCIAL

## 2025-08-18 ENCOUNTER — PATIENT MESSAGE (OUTPATIENT)
Dept: INTERNAL MEDICINE | Facility: CLINIC | Age: 45
End: 2025-08-18

## 2025-08-18 VITALS
OXYGEN SATURATION: 99 % | HEIGHT: 62 IN | RESPIRATION RATE: 18 BRPM | BODY MASS INDEX: 36.47 KG/M2 | WEIGHT: 198.19 LBS | HEART RATE: 108 BPM | SYSTOLIC BLOOD PRESSURE: 134 MMHG | TEMPERATURE: 98 F | DIASTOLIC BLOOD PRESSURE: 92 MMHG

## 2025-08-18 DIAGNOSIS — M35.00 SJOGREN'S SYNDROME, WITH UNSPECIFIED ORGAN INVOLVEMENT: ICD-10-CM

## 2025-08-18 DIAGNOSIS — Z00.00 ENCOUNTER FOR ANNUAL HEALTH EXAMINATION: Primary | ICD-10-CM

## 2025-08-18 DIAGNOSIS — Z12.12 ENCOUNTER FOR COLORECTAL CANCER SCREENING: ICD-10-CM

## 2025-08-18 DIAGNOSIS — F32.A ANXIETY AND DEPRESSION: ICD-10-CM

## 2025-08-18 DIAGNOSIS — R20.2 PARESTHESIA: ICD-10-CM

## 2025-08-18 DIAGNOSIS — E55.9 VITAMIN D INSUFFICIENCY: ICD-10-CM

## 2025-08-18 DIAGNOSIS — M32.9 SYSTEMIC LUPUS ERYTHEMATOSUS, UNSPECIFIED SLE TYPE, UNSPECIFIED ORGAN INVOLVEMENT STATUS: ICD-10-CM

## 2025-08-18 DIAGNOSIS — Z00.00 ENCOUNTER FOR ANNUAL HEALTH EXAMINATION: ICD-10-CM

## 2025-08-18 DIAGNOSIS — Z12.31 ENCOUNTER FOR SCREENING MAMMOGRAM FOR BREAST CANCER: ICD-10-CM

## 2025-08-18 DIAGNOSIS — Z12.11 ENCOUNTER FOR COLORECTAL CANCER SCREENING: ICD-10-CM

## 2025-08-18 DIAGNOSIS — F41.9 ANXIETY AND DEPRESSION: ICD-10-CM

## 2025-08-18 DIAGNOSIS — R03.0 ELEVATED BLOOD PRESSURE READING WITHOUT DIAGNOSIS OF HYPERTENSION: ICD-10-CM

## 2025-08-18 DIAGNOSIS — M79.89 LEG SWELLING: ICD-10-CM

## 2025-08-18 PROBLEM — J06.9 UPPER RESPIRATORY TRACT INFECTION: Status: RESOLVED | Noted: 2024-11-08 | Resolved: 2025-08-18

## 2025-08-18 PROBLEM — Z29.9 PREVENTIVE MEASURE: Status: RESOLVED | Noted: 2024-11-08 | Resolved: 2025-08-18

## 2025-08-18 PROBLEM — R07.9 CHEST PAIN: Status: RESOLVED | Noted: 2020-12-04 | Resolved: 2025-08-18

## 2025-08-18 PROBLEM — J01.00 ACUTE NON-RECURRENT MAXILLARY SINUSITIS: Status: RESOLVED | Noted: 2024-11-08 | Resolved: 2025-08-18

## 2025-08-18 PROBLEM — R05.9 COUGH: Status: RESOLVED | Noted: 2024-11-12 | Resolved: 2025-08-18

## 2025-08-18 LAB
25(OH)D3+25(OH)D2 SERPL-MCNC: 6 NG/ML (ref 30–96)
ABSOLUTE EOSINOPHIL (OHS): 0.17 K/UL
ABSOLUTE MONOCYTE (OHS): 0.78 K/UL (ref 0.3–1)
ABSOLUTE NEUTROPHIL COUNT (OHS): 4.21 K/UL (ref 1.8–7.7)
ALBUMIN SERPL BCP-MCNC: 3.9 G/DL (ref 3.5–5.2)
ALP SERPL-CCNC: 77 UNIT/L (ref 40–150)
ALT SERPL W/O P-5'-P-CCNC: 14 UNIT/L (ref 0–55)
ANION GAP (OHS): 8 MMOL/L (ref 8–16)
AST SERPL-CCNC: 37 UNIT/L (ref 0–50)
BASOPHILS # BLD AUTO: 0.04 K/UL
BASOPHILS NFR BLD AUTO: 0.5 %
BILIRUB SERPL-MCNC: 0.4 MG/DL (ref 0.1–1)
BUN SERPL-MCNC: 9 MG/DL (ref 6–20)
CALCIUM SERPL-MCNC: 8.9 MG/DL (ref 8.7–10.5)
CHLORIDE SERPL-SCNC: 109 MMOL/L (ref 95–110)
CHOLEST SERPL-MCNC: 177 MG/DL (ref 120–199)
CHOLEST/HDLC SERPL: 4.7 {RATIO} (ref 2–5)
CO2 SERPL-SCNC: 19 MMOL/L (ref 23–29)
CREAT SERPL-MCNC: 0.7 MG/DL (ref 0.5–1.4)
EAG (OHS): 111 MG/DL (ref 68–131)
ERYTHROCYTE [DISTWIDTH] IN BLOOD BY AUTOMATED COUNT: 17.7 % (ref 11.5–14.5)
FERRITIN SERPL-MCNC: 22 NG/ML (ref 20–300)
GFR SERPLBLD CREATININE-BSD FMLA CKD-EPI: >60 ML/MIN/1.73/M2
GLUCOSE SERPL-MCNC: 71 MG/DL (ref 70–110)
HBA1C MFR BLD: 5.5 % (ref 4–5.6)
HCT VFR BLD AUTO: 37.4 % (ref 37–48.5)
HDLC SERPL-MCNC: 38 MG/DL (ref 40–75)
HDLC SERPL: 21.5 % (ref 20–50)
HGB BLD-MCNC: 11.6 GM/DL (ref 12–16)
IMM GRANULOCYTES # BLD AUTO: 0.02 K/UL (ref 0–0.04)
IMM GRANULOCYTES NFR BLD AUTO: 0.2 % (ref 0–0.5)
IRON SATN MFR SERPL: 21 % (ref 20–50)
IRON SERPL-MCNC: 102 UG/DL (ref 30–160)
LDLC SERPL CALC-MCNC: 116.2 MG/DL (ref 63–159)
LYMPHOCYTES # BLD AUTO: 2.98 K/UL (ref 1–4.8)
MAGNESIUM SERPL-MCNC: 1.8 MG/DL (ref 1.6–2.6)
MCH RBC QN AUTO: 24.2 PG (ref 27–31)
MCHC RBC AUTO-ENTMCNC: 31 G/DL (ref 32–36)
MCV RBC AUTO: 78 FL (ref 82–98)
NONHDLC SERPL-MCNC: 139 MG/DL
NUCLEATED RBC (/100WBC) (OHS): 0 /100 WBC
PLATELET # BLD AUTO: 416 K/UL (ref 150–450)
PMV BLD AUTO: 11.1 FL (ref 9.2–12.9)
POTASSIUM SERPL-SCNC: 3.7 MMOL/L (ref 3.5–5.1)
PROT SERPL-MCNC: 7.3 GM/DL (ref 6–8.4)
RBC # BLD AUTO: 4.8 M/UL (ref 4–5.4)
RELATIVE EOSINOPHIL (OHS): 2.1 %
RELATIVE LYMPHOCYTE (OHS): 36.3 % (ref 18–48)
RELATIVE MONOCYTE (OHS): 9.5 % (ref 4–15)
RELATIVE NEUTROPHIL (OHS): 51.4 % (ref 38–73)
SODIUM SERPL-SCNC: 136 MMOL/L (ref 136–145)
TIBC SERPL-MCNC: 491 UG/DL (ref 250–450)
TRANSFERRIN SERPL-MCNC: 332 MG/DL (ref 200–375)
TRIGL SERPL-MCNC: 114 MG/DL (ref 30–150)
TSH SERPL-ACNC: 1 UIU/ML (ref 0.4–4)
VIT B12 SERPL-MCNC: 321 PG/ML (ref 210–950)
WBC # BLD AUTO: 8.2 K/UL (ref 3.9–12.7)

## 2025-08-18 PROCEDURE — 83540 ASSAY OF IRON: CPT

## 2025-08-18 PROCEDURE — 36415 COLL VENOUS BLD VENIPUNCTURE: CPT | Mod: PO

## 2025-08-18 PROCEDURE — 83036 HEMOGLOBIN GLYCOSYLATED A1C: CPT

## 2025-08-18 PROCEDURE — 3008F BODY MASS INDEX DOCD: CPT | Mod: CPTII,S$GLB,, | Performed by: NURSE PRACTITIONER

## 2025-08-18 PROCEDURE — 99999 PR PBB SHADOW E&M-EST. PATIENT-LVL V: CPT | Mod: PBBFAC,,, | Performed by: NURSE PRACTITIONER

## 2025-08-18 PROCEDURE — 1160F RVW MEDS BY RX/DR IN RCRD: CPT | Mod: CPTII,S$GLB,, | Performed by: NURSE PRACTITIONER

## 2025-08-18 PROCEDURE — 80053 COMPREHEN METABOLIC PANEL: CPT

## 2025-08-18 PROCEDURE — 85025 COMPLETE CBC W/AUTO DIFF WBC: CPT

## 2025-08-18 PROCEDURE — 82728 ASSAY OF FERRITIN: CPT

## 2025-08-18 PROCEDURE — 82306 VITAMIN D 25 HYDROXY: CPT

## 2025-08-18 PROCEDURE — 99396 PREV VISIT EST AGE 40-64: CPT | Mod: S$GLB,,, | Performed by: NURSE PRACTITIONER

## 2025-08-18 PROCEDURE — 83735 ASSAY OF MAGNESIUM: CPT

## 2025-08-18 PROCEDURE — 80061 LIPID PANEL: CPT

## 2025-08-18 PROCEDURE — 3079F DIAST BP 80-89 MM HG: CPT | Mod: CPTII,S$GLB,, | Performed by: NURSE PRACTITIONER

## 2025-08-18 PROCEDURE — 3075F SYST BP GE 130 - 139MM HG: CPT | Mod: CPTII,S$GLB,, | Performed by: NURSE PRACTITIONER

## 2025-08-18 PROCEDURE — 82607 VITAMIN B-12: CPT

## 2025-08-18 PROCEDURE — 84443 ASSAY THYROID STIM HORMONE: CPT

## 2025-08-18 PROCEDURE — 3044F HG A1C LEVEL LT 7.0%: CPT | Mod: CPTII,S$GLB,, | Performed by: NURSE PRACTITIONER

## 2025-08-18 PROCEDURE — 1159F MED LIST DOCD IN RCRD: CPT | Mod: CPTII,S$GLB,, | Performed by: NURSE PRACTITIONER

## 2025-08-18 RX ORDER — BUSPIRONE HYDROCHLORIDE 5 MG/1
5 TABLET ORAL 2 TIMES DAILY
Qty: 60 TABLET | Refills: 11 | Status: SHIPPED | OUTPATIENT
Start: 2025-08-18 | End: 2026-08-18

## 2025-08-21 DIAGNOSIS — Z12.31 ENCOUNTER FOR SCREENING MAMMOGRAM FOR BREAST CANCER: ICD-10-CM

## 2025-08-21 DIAGNOSIS — N64.4 BREAST PAIN: Primary | ICD-10-CM

## 2025-08-27 ENCOUNTER — E-VISIT (OUTPATIENT)
Dept: INTERNAL MEDICINE | Facility: CLINIC | Age: 45
End: 2025-08-27
Payer: COMMERCIAL

## 2025-08-27 DIAGNOSIS — R68.89 GENERALLY UNWELL: Primary | ICD-10-CM

## 2025-08-28 ENCOUNTER — PATIENT MESSAGE (OUTPATIENT)
Dept: INTERNAL MEDICINE | Facility: CLINIC | Age: 45
End: 2025-08-28
Payer: COMMERCIAL

## 2025-08-29 ENCOUNTER — OFFICE VISIT (OUTPATIENT)
Dept: CARDIOLOGY | Facility: CLINIC | Age: 45
End: 2025-08-29
Payer: COMMERCIAL

## 2025-08-29 VITALS
WEIGHT: 194.25 LBS | SYSTOLIC BLOOD PRESSURE: 136 MMHG | DIASTOLIC BLOOD PRESSURE: 87 MMHG | BODY MASS INDEX: 35.52 KG/M2 | HEART RATE: 101 BPM

## 2025-08-29 DIAGNOSIS — I89.0 LYMPHEDEMA: Primary | ICD-10-CM

## 2025-08-29 DIAGNOSIS — I83.92 ASYMPTOMATIC VARICOSE VEINS OF LEFT LOWER EXTREMITY: ICD-10-CM

## 2025-08-29 DIAGNOSIS — E66.9 OBESITY (BMI 30-39.9): ICD-10-CM

## 2025-08-29 PROCEDURE — 99204 OFFICE O/P NEW MOD 45 MIN: CPT | Mod: S$GLB,,, | Performed by: INTERNAL MEDICINE

## 2025-08-29 PROCEDURE — 3044F HG A1C LEVEL LT 7.0%: CPT | Mod: CPTII,S$GLB,, | Performed by: INTERNAL MEDICINE

## 2025-08-29 PROCEDURE — 3075F SYST BP GE 130 - 139MM HG: CPT | Mod: CPTII,S$GLB,, | Performed by: INTERNAL MEDICINE

## 2025-08-29 PROCEDURE — 3008F BODY MASS INDEX DOCD: CPT | Mod: CPTII,S$GLB,, | Performed by: INTERNAL MEDICINE

## 2025-08-29 PROCEDURE — 99999 PR PBB SHADOW E&M-EST. PATIENT-LVL V: CPT | Mod: PBBFAC,,, | Performed by: INTERNAL MEDICINE

## 2025-08-29 PROCEDURE — 3079F DIAST BP 80-89 MM HG: CPT | Mod: CPTII,S$GLB,, | Performed by: INTERNAL MEDICINE

## 2025-08-29 PROCEDURE — 1160F RVW MEDS BY RX/DR IN RCRD: CPT | Mod: CPTII,S$GLB,, | Performed by: INTERNAL MEDICINE

## 2025-08-29 PROCEDURE — 1159F MED LIST DOCD IN RCRD: CPT | Mod: CPTII,S$GLB,, | Performed by: INTERNAL MEDICINE

## 2025-09-02 DIAGNOSIS — F41.9 ANXIETY AND DEPRESSION: Primary | ICD-10-CM

## 2025-09-02 DIAGNOSIS — F32.A ANXIETY AND DEPRESSION: Primary | ICD-10-CM

## 2025-09-03 ENCOUNTER — OFFICE VISIT (OUTPATIENT)
Dept: PODIATRY | Facility: CLINIC | Age: 45
End: 2025-09-03
Payer: COMMERCIAL

## 2025-09-03 ENCOUNTER — OFFICE VISIT (OUTPATIENT)
Dept: OPTOMETRY | Facility: CLINIC | Age: 45
End: 2025-09-03
Payer: COMMERCIAL

## 2025-09-03 VITALS
WEIGHT: 194.25 LBS | SYSTOLIC BLOOD PRESSURE: 141 MMHG | BODY MASS INDEX: 35.75 KG/M2 | HEART RATE: 90 BPM | DIASTOLIC BLOOD PRESSURE: 90 MMHG | HEIGHT: 62 IN

## 2025-09-03 DIAGNOSIS — B35.3 TINEA PEDIS OF BOTH FEET: Primary | ICD-10-CM

## 2025-09-03 DIAGNOSIS — M35.00 SJOGREN'S SYNDROME, WITH UNSPECIFIED ORGAN INVOLVEMENT: ICD-10-CM

## 2025-09-03 DIAGNOSIS — H52.13 MYOPIA OF BOTH EYES WITH ASTIGMATISM AND PRESBYOPIA: ICD-10-CM

## 2025-09-03 DIAGNOSIS — M21.41 PES PLANUS OF BOTH FEET: ICD-10-CM

## 2025-09-03 DIAGNOSIS — I83.92 ASYMPTOMATIC VARICOSE VEINS OF LEFT LOWER EXTREMITY: ICD-10-CM

## 2025-09-03 DIAGNOSIS — M21.6X1 EQUINUS DEFORMITY OF BOTH FEET: ICD-10-CM

## 2025-09-03 DIAGNOSIS — B35.1 ONYCHOMYCOSIS DUE TO DERMATOPHYTE: ICD-10-CM

## 2025-09-03 DIAGNOSIS — H52.203 MYOPIA OF BOTH EYES WITH ASTIGMATISM AND PRESBYOPIA: ICD-10-CM

## 2025-09-03 DIAGNOSIS — L60.9 DISEASE OF NAIL: ICD-10-CM

## 2025-09-03 DIAGNOSIS — H52.4 MYOPIA OF BOTH EYES WITH ASTIGMATISM AND PRESBYOPIA: ICD-10-CM

## 2025-09-03 DIAGNOSIS — H40.013 OAG (OPEN ANGLE GLAUCOMA) SUSPECT, LOW RISK, BILATERAL: Primary | ICD-10-CM

## 2025-09-03 DIAGNOSIS — I89.0 LYMPHEDEMA: ICD-10-CM

## 2025-09-03 DIAGNOSIS — M21.42 PES PLANUS OF BOTH FEET: ICD-10-CM

## 2025-09-03 DIAGNOSIS — E66.9 OBESITY (BMI 30-39.9): Primary | ICD-10-CM

## 2025-09-03 DIAGNOSIS — M21.6X2 EQUINUS DEFORMITY OF BOTH FEET: ICD-10-CM

## 2025-09-03 PROCEDURE — 92015 DETERMINE REFRACTIVE STATE: CPT | Mod: S$GLB,,, | Performed by: OPTOMETRIST

## 2025-09-03 PROCEDURE — 1159F MED LIST DOCD IN RCRD: CPT | Mod: CPTII,S$GLB,, | Performed by: OPTOMETRIST

## 2025-09-03 PROCEDURE — 99999 PR PBB SHADOW E&M-EST. PATIENT-LVL III: CPT | Mod: PBBFAC,,, | Performed by: OPTOMETRIST

## 2025-09-03 PROCEDURE — 3077F SYST BP >= 140 MM HG: CPT | Mod: CPTII,S$GLB,, | Performed by: PODIATRIST

## 2025-09-03 PROCEDURE — 3044F HG A1C LEVEL LT 7.0%: CPT | Mod: CPTII,S$GLB,, | Performed by: OPTOMETRIST

## 2025-09-03 PROCEDURE — 1159F MED LIST DOCD IN RCRD: CPT | Mod: CPTII,S$GLB,, | Performed by: PODIATRIST

## 2025-09-03 PROCEDURE — 99999 PR PBB SHADOW E&M-EST. PATIENT-LVL III: CPT | Mod: PBBFAC,,, | Performed by: PODIATRIST

## 2025-09-03 PROCEDURE — 99204 OFFICE O/P NEW MOD 45 MIN: CPT | Mod: S$GLB,,, | Performed by: PODIATRIST

## 2025-09-03 PROCEDURE — 99204 OFFICE O/P NEW MOD 45 MIN: CPT | Mod: S$GLB,,, | Performed by: OPTOMETRIST

## 2025-09-03 PROCEDURE — 3008F BODY MASS INDEX DOCD: CPT | Mod: CPTII,S$GLB,, | Performed by: PODIATRIST

## 2025-09-03 PROCEDURE — 3044F HG A1C LEVEL LT 7.0%: CPT | Mod: CPTII,S$GLB,, | Performed by: PODIATRIST

## 2025-09-03 PROCEDURE — 3080F DIAST BP >= 90 MM HG: CPT | Mod: CPTII,S$GLB,, | Performed by: PODIATRIST

## 2025-09-03 PROCEDURE — 92133 CPTRZD OPH DX IMG PST SGM ON: CPT | Mod: S$GLB,,, | Performed by: OPTOMETRIST

## 2025-09-03 RX ORDER — CICLOPIROX OLAMINE 7.7 MG/G
CREAM TOPICAL 2 TIMES DAILY
Qty: 30 G | Refills: 8 | Status: SHIPPED | OUTPATIENT
Start: 2025-09-03 | End: 2025-10-18

## 2025-09-03 RX ORDER — CICLOPIROX 80 MG/ML
SOLUTION TOPICAL NIGHTLY
Qty: 6.6 ML | Refills: 11 | Status: SHIPPED | OUTPATIENT
Start: 2025-09-03

## 2025-09-04 LAB
OHS QRS DURATION: 70 MS
OHS QTC CALCULATION: 418 MS